# Patient Record
Sex: FEMALE | Race: WHITE | NOT HISPANIC OR LATINO | Employment: OTHER | URBAN - NONMETROPOLITAN AREA
[De-identification: names, ages, dates, MRNs, and addresses within clinical notes are randomized per-mention and may not be internally consistent; named-entity substitution may affect disease eponyms.]

---

## 2023-09-28 ENCOUNTER — HOSPITAL ENCOUNTER (OUTPATIENT)
Dept: GENERAL RADIOLOGY | Facility: HOSPITAL | Age: 73
Discharge: HOME | End: 2023-09-28
Payer: MEDICARE

## 2023-09-28 ENCOUNTER — OFFICE VISIT (OUTPATIENT)
Dept: NEUROSURGERY | Facility: CLINIC | Age: 73
End: 2023-09-28
Payer: MEDICARE

## 2023-09-28 VITALS — BODY MASS INDEX: 35.32 KG/M2 | HEIGHT: 65 IN | WEIGHT: 212 LBS

## 2023-09-28 DIAGNOSIS — M41.56 SCOLIOSIS OF LUMBAR REGION DUE TO DEGENERATIVE DISEASE OF SPINE IN ADULT: ICD-10-CM

## 2023-09-28 DIAGNOSIS — M51.34 DDD (DEGENERATIVE DISC DISEASE), THORACIC: Primary | ICD-10-CM

## 2023-09-28 DIAGNOSIS — M51.34 DDD (DEGENERATIVE DISC DISEASE), THORACIC: ICD-10-CM

## 2023-09-28 DIAGNOSIS — M48.062 SPINAL STENOSIS, LUMBAR REGION, WITH NEUROGENIC CLAUDICATION: ICD-10-CM

## 2023-09-28 PROCEDURE — 72072 X-RAY EXAM THORAC SPINE 3VWS: CPT

## 2023-09-28 PROCEDURE — 72114 X-RAY EXAM L-S SPINE BENDING: CPT

## 2023-09-28 RX ORDER — SUCRALFATE 1 G/1
1 TABLET ORAL 4 TIMES DAILY
COMMUNITY
Start: 2023-08-30

## 2023-09-28 RX ORDER — BETAMETHASONE DIPROPIONATE 0.05 %
1 OINTMENT (GRAM) TOPICAL DAILY
COMMUNITY
Start: 2023-07-06

## 2023-09-28 RX ORDER — EZETIMIBE 10 MG/1
10 TABLET ORAL DAILY
COMMUNITY
Start: 2023-06-16

## 2023-09-28 RX ORDER — CYCLOBENZAPRINE HCL 10 MG
10 TABLET ORAL 3 TIMES DAILY PRN
Qty: 90 TABLET | Refills: 0 | Status: SHIPPED | OUTPATIENT
Start: 2023-09-28

## 2023-09-28 RX ORDER — LISINOPRIL 10 MG/1
10 TABLET ORAL 2 TIMES DAILY
COMMUNITY
Start: 2023-08-31

## 2023-09-28 RX ORDER — HYDROCHLOROTHIAZIDE 12.5 MG/1
12.5 CAPSULE, GELATIN COATED ORAL EVERY MORNING
COMMUNITY
Start: 2023-07-10

## 2023-09-28 RX ORDER — ATENOLOL 25 MG/1
25 TABLET ORAL DAILY
COMMUNITY
Start: 2023-09-27

## 2023-09-28 RX ORDER — OMEPRAZOLE 40 MG/1
40 CAPSULE, DELAYED RELEASE ORAL DAILY
COMMUNITY
Start: 2023-06-15

## 2023-09-28 NOTE — PROGRESS NOTES
"    Chief complaint:   Chief Complaint   Patient presents with    Back Pain     Low back pain radiates down both legs        Subjective     HPI: Kemi presents with complaints of worsening chronic low back pain.  She has history of L4-5 KLIF with pseudoarthrosis and revision surgery, Dr Glez 2022.  She is also having some lower thoracic pain with radiation to the right anterior costal margin.  Symptoms significantly exacerbated in July after doing quite a bit of yard work that involves a lot of bending.  She indicates that with standing greater than 15 minutes her legs feel heavy.  She is having some radiating pain from her lower lumbar spine to bilateral iliac crests. She reports some paresthesias down the lateral anterior thighs.  She is currently taking gabapentin 200 mg at bedtime.  She is unable to take NSAIDs due to CKD.  He has used Flexeril in the past.  She denies any focal weakness.  She indicates that she recently completed 6 weeks of physical therapy with a therapist known to her in her home.  It was mildly beneficial.    Review of Systems     Past Medical History:   Diagnosis Date    Arthritis     Back pain     GERD (gastroesophageal reflux disease)     HTN (hypertension)     Kidney disease, chronic, stage III (GFR 30-59 ml/min)      Past Surgical History:   Procedure Laterality Date    BACK SURGERY      BUNIONECTOMY      CHOLECYSTECTOMY      HYSTERECTOMY      Partial    KNEE SURGERY       History reviewed. No pertinent family history.  Social History     Tobacco Use    Smoking status: Former     Types: Cigarettes    Smokeless tobacco: Never   Substance Use Topics    Alcohol use: Never    Drug use: Never     (Not in a hospital admission)    Allergies:  Sulfamethoxazole-trimethoprim    Objective      Vital Signs  Ht 165.1 cm (65\")   Wt 96.2 kg (212 lb)   BMI 35.28 kg/m²     Physical Exam  Constitutional:       Appearance: She is well-developed. She is obese.   HENT:      Head: Normocephalic.   Eyes: "      General: Lids are normal.      Conjunctiva/sclera: Conjunctivae normal.      Pupils: Pupils are equal, round, and reactive to light.   Cardiovascular:      Rate and Rhythm: Normal rate and regular rhythm.   Pulmonary:      Effort: Pulmonary effort is normal.      Breath sounds: Normal breath sounds.   Musculoskeletal:         General: Tenderness present. Injury: There is tenderness to palpation bilateral SI joints..Normal range of motion.      Cervical back: Normal range of motion.   Skin:     General: Skin is warm.   Neurological:      Mental Status: She is alert and oriented to person, place, and time.      GCS: GCS eye subscore is 4. GCS verbal subscore is 5. GCS motor subscore is 6.      Cranial Nerves: Cranial nerves 2-12 are intact. No cranial nerve deficit.      Sensory: No sensory deficit.      Deep Tendon Reflexes: Reflexes normal.      Reflex Scores:       Patellar reflexes are 1+ on the right side and 1+ on the left side.       Achilles reflexes are 1+ on the right side and 1+ on the left side.  Psychiatric:         Speech: Speech normal.         Behavior: Behavior normal.         Thought Content: Thought content normal.       Neurologic Exam     Mental Status   Oriented to person, place, and time.   Speech: speech is normal     Cranial Nerves   Cranial nerves II through XII intact.     CN III, IV, VI   Pupils are equal, round, and reactive to light.    Motor Exam   Muscle bulk: decreased  Overall muscle tone: normal    Strength   Right iliopsoas: 5/5  Left iliopsoas: 5/5  Right quadriceps: 5/5  Left quadriceps: 5/5  Right hamstrin/5  Left hamstrin/5  Right anterior tibial: 5/5  Left anterior tibial: 5/5  Right posterior tibial: 5/5  Left posterior tibial: 5/5  Right gastroc: 5/5  Left gastroc: 5/5Exquisite tenderness bilateral SI joints.     Sensory Exam   Light touch normal.     Gait, Coordination, and Reflexes     Reflexes   Right patellar: 1+  Left patellar: 1+  Right achilles: 1+  Left  achilles: 1+  Right Warner: absent  Left Warner: absent  Right ankle clonus: absent  Left ankle clonus: absentGait is compensated but steady.     Imaging review: MRI of the lumbar spine without contrast dated 2/3/2023 was reviewed and demonstrates fusion changes L4-5.  There is severe disc degeneration L3-4 with moderate central stenosis, bilateral lateral recess stenosis and moderate left neuroforaminal stenosis. There is moderate to severe NFS on the left at L2/3.  There is also severe disc degeneration T12-L1.  There is degenerative scoliosis with apex L2-3.  There does appear to be slight lateral listhesis to the left L1-2.        Assessment/Plan: Kemi has severe disc degeneration L3-4 with moderate central stenosis and degenerative lumbar scoliosis with lateral listhesis L1-2.  There is also severe disc degeneration T12-L1 she is having a thoracic radiculopathy on the right.  She is reporting some neurogenic claudication symptoms.  I do not see any marked central stenosis on her lumbar MRI and since she is complaining of lower thoracic pain with radicular symptoms, I would like to get MRI of the thoracic spine without contrast to better assess her issues.  She is neurologically stable on exam.  She has completed 6 weeks of physical therapy without symptom resolution.     I would like to get the lumbar and thoracic films today evaluate alignment and for any instability.    To assist with pain, I am giving her a prescription for Flexeril today.  We will continue gabapentin as current.  She will follow-up after the MRI is completed with Dr. Glez.    I advised the patient to call and return sooner for new or worsening complaints of weakness, paresthesias, gait disturbances, or any additional concerns.  Treatment options discussed in detail with Kemi and the patient voiced understanding.  Ms. Lorenzana agrees with this plan of care.     Patient is a nonsmoker    The patient's Body mass index is 35.28 kg/m².. BMI  is above normal parameters. Recommendations include: educational material    ADVANCED CARE PLANNING  Information on advance directives provided in AVS.    KIERSTEN Fall Risk Assessment was completed, and patient is at MODERATE risk for falls. Assessment completed on:9/28/2023       Diagnoses and all orders for this visit:    1. DDD (degenerative disc disease), thoracic (Primary)  -     MRI Thoracic Spine Without Contrast; Future  -     XR Spine Thoracic 2 View; Future  -     cyclobenzaprine (FLEXERIL) 10 MG tablet; Take 1 tablet by mouth 3 (Three) Times a Day As Needed for Muscle Spasms.  Dispense: 90 tablet; Refill: 0  -     XR Spine Lumbar Complete With Flex & Ext    2. Scoliosis of lumbar region due to degenerative disease of spine in adult  -     MRI Thoracic Spine Without Contrast; Future  -     XR Spine Thoracic 2 View; Future  -     cyclobenzaprine (FLEXERIL) 10 MG tablet; Take 1 tablet by mouth 3 (Three) Times a Day As Needed for Muscle Spasms.  Dispense: 90 tablet; Refill: 0  -     XR Spine Lumbar Complete With Flex & Ext    3. Spinal stenosis, lumbar region, with neurogenic claudication          I discussed the patients findings and my recommendations with patient    Myriam Larson PA-C  09/28/23  15:09 CDT

## 2023-10-31 ENCOUNTER — HOSPITAL ENCOUNTER (OUTPATIENT)
Dept: MRI IMAGING | Facility: HOSPITAL | Age: 73
Discharge: HOME | End: 2023-10-31
Admitting: PHYSICIAN ASSISTANT
Payer: MEDICARE

## 2023-10-31 ENCOUNTER — OFFICE VISIT (OUTPATIENT)
Dept: NEUROSURGERY | Facility: CLINIC | Age: 73
End: 2023-10-31
Payer: MEDICARE

## 2023-10-31 VITALS — HEIGHT: 65 IN | WEIGHT: 227 LBS | BODY MASS INDEX: 37.82 KG/M2

## 2023-10-31 DIAGNOSIS — M54.40 CHRONIC LOW BACK PAIN WITH SCIATICA, SCIATICA LATERALITY UNSPECIFIED, UNSPECIFIED BACK PAIN LATERALITY: ICD-10-CM

## 2023-10-31 DIAGNOSIS — Z78.9 NON-SMOKER: Primary | ICD-10-CM

## 2023-10-31 DIAGNOSIS — M54.16 LUMBAR RADICULOPATHY: ICD-10-CM

## 2023-10-31 DIAGNOSIS — M51.34 DDD (DEGENERATIVE DISC DISEASE), THORACIC: ICD-10-CM

## 2023-10-31 DIAGNOSIS — M41.56 SCOLIOSIS OF LUMBAR REGION DUE TO DEGENERATIVE DISEASE OF SPINE IN ADULT: ICD-10-CM

## 2023-10-31 DIAGNOSIS — M41.9 SCOLIOSIS, UNSPECIFIED SCOLIOSIS TYPE, UNSPECIFIED SPINAL REGION: ICD-10-CM

## 2023-10-31 DIAGNOSIS — G89.29 CHRONIC LOW BACK PAIN WITH SCIATICA, SCIATICA LATERALITY UNSPECIFIED, UNSPECIFIED BACK PAIN LATERALITY: ICD-10-CM

## 2023-10-31 DIAGNOSIS — E66.09 CLASS 2 OBESITY DUE TO EXCESS CALORIES WITH BODY MASS INDEX (BMI) OF 37.0 TO 37.9 IN ADULT, UNSPECIFIED WHETHER SERIOUS COMORBIDITY PRESENT: ICD-10-CM

## 2023-10-31 PROCEDURE — 72146 MRI CHEST SPINE W/O DYE: CPT

## 2023-10-31 NOTE — PROGRESS NOTES
"    Chief complaint:   Chief Complaint   Patient presents with    Back Pain     Follow up on back pain and discuss MRI        Subjective     HPI: I had a chance to see Kemi today in follow-up and to review her imaging studies of the lumbar and thoracic spine.  She does have very severe thoracic degenerative disc disease however I do not see anything that would require surgical intervention however she may benefit from some injections.  She largely complains of some lumbar back pain with occasional L3 and L4 symptoms which are consistent with the severe Modic changes that we see above the level of her fusion at L4/5.  She does have a scoliotic curvature centered around L2/3 and L3/4 and most of her symptoms are on the left which makes sense for the type of curvature she is displaying in her coronal MRIs.    Review of Systems      Objective      Vital Signs  Ht 165.1 cm (65\")   Wt 103 kg (227 lb)   BMI 37.77 kg/m²     Physical Exam  Neurological:      Mental Status: She is oriented to person, place, and time.      Cranial Nerves: Cranial nerves 2-12 are intact.      Motor: Motor strength is normal.     Gait: Gait is intact.   Psychiatric:         Speech: Speech normal.         Neurologic Exam     Mental Status   Oriented to person, place, and time.   Attention: normal. Concentration: normal.   Speech: speech is normal   Level of consciousness: alert  Knowledge: good.   Normal comprehension.     Cranial Nerves   Cranial nerves II through XII intact.     Motor Exam     Strength   Strength 5/5 throughout.     Sensory Exam   Light touch normal.     Gait, Coordination, and Reflexes     Gait  Gait: normal      Imaging review: MRI of the lumbar spine does show significant degenerative disc disease particularly above the level of her previous L4/5 fusion and severe Modic changes are present at L3/4 and to a lesser degree L2/3.  I do think that these levels are most likely the levels that are causing the bulk of her pain " in both the back and the legs.        Assessment/Plan:   Severe disc degeneration most pronounced at L2/3 and L3/4  Degenerative scoliosis    At this point I do think she may be a surgical candidate for what I am seeing at L2/3 and L3/4 and we would likely use a direct lateral approach and revise her posterior pedicle screw instrumentation to cover these levels.  I do think it would be prudent to try some facet injections with possible rhizotomies to see if we can cut down enough of the pain to avoid the surgery at this point.  We will refer her to Dr. Adan for some lumbar facet injections at L2/3 and L3/4 with possible rhizotomies to see if we can get her pain under control and avoid surgery.  I would like to see her back once these injections are complete.  I look forward to seeing her at her next visit.    Patient is a nonsmoker  The patient's Body mass index is 37.77 kg/m².. BMI is above normal parameters. Recommendations include: continue with current weight loss program    Diagnoses and all orders for this visit:    1. Non-smoker (Primary)    2. Class 2 obesity due to excess calories with body mass index (BMI) of 37.0 to 37.9 in adult, unspecified whether serious comorbidity present    3. Chronic low back pain with sciatica, sciatica laterality unspecified, unspecified back pain laterality  -     Ambulatory Referral to Pain Management    4. Scoliosis, unspecified scoliosis type, unspecified spinal region  -     Ambulatory Referral to Pain Management    5. Lumbar radiculopathy        I discussed the patients findings and my recommendations with patient  Roland HOPKINS DO [place]  10/31/23  13:40 CDT

## 2024-02-06 ENCOUNTER — OFFICE VISIT (OUTPATIENT)
Dept: NEUROSURGERY | Facility: CLINIC | Age: 74
End: 2024-02-06
Payer: MEDICARE

## 2024-02-06 VITALS — HEIGHT: 65 IN | WEIGHT: 227 LBS | BODY MASS INDEX: 37.82 KG/M2

## 2024-02-06 DIAGNOSIS — Z78.9 NON-SMOKER: ICD-10-CM

## 2024-02-06 DIAGNOSIS — E66.01 CLASS 2 SEVERE OBESITY DUE TO EXCESS CALORIES WITH SERIOUS COMORBIDITY AND BODY MASS INDEX (BMI) OF 37.0 TO 37.9 IN ADULT: ICD-10-CM

## 2024-02-06 DIAGNOSIS — M41.9 SCOLIOSIS, UNSPECIFIED SCOLIOSIS TYPE, UNSPECIFIED SPINAL REGION: Primary | ICD-10-CM

## 2024-02-06 PROCEDURE — 99213 OFFICE O/P EST LOW 20 MIN: CPT | Performed by: NEUROLOGICAL SURGERY

## 2024-02-06 RX ORDER — LISINOPRIL 30 MG/1
40 TABLET ORAL DAILY
COMMUNITY
Start: 2023-12-28

## 2024-02-06 RX ORDER — AMLODIPINE BESYLATE 10 MG/1
10 TABLET ORAL DAILY
COMMUNITY
Start: 2024-01-26

## 2024-02-06 NOTE — PROGRESS NOTES
"    Chief complaint:   Chief Complaint   Patient presents with    Follow-up     Follow up on mid-lower back pain injections are helping some        Subjective     HPI: I had a chance to see Kemi today in follow-up and to review her imaging studies of the lumbar spine.  Fortunately she has done very well with injections and rhizotomies and I do think we are doing well enough to avoid surgery.  I would like to see her back in another 8 weeks to make sure she is still doing well.      Review of Systems      Objective      Vital Signs  Ht 165.1 cm (65\")   Wt 103 kg (227 lb)   BMI 37.77 kg/m²     Physical Exam    Neurologic Exam    Imaging review: No new imaging studies        Assessment/Plan:   Multilevel degenerative disc disease of the lumbar spine  Scoliotic deformity    Given the fact that she is doing so well with the rhizotomies and injections I think we can avoid surgery for at least right now.  I would like to see her back in 2 to 3 months to check and see how she is doing.  I look forward to seeing her at her next visit.    Patient is a nonsmoker  The patient's Body mass index is 37.77 kg/m².. BMI is above normal parameters. Recommendations include: continue with current weight loss program    Diagnoses and all orders for this visit:    1. Scoliosis, unspecified scoliosis type, unspecified spinal region (Primary)    2. Non-smoker    3. Class 2 severe obesity due to excess calories with serious comorbidity and body mass index (BMI) of 37.0 to 37.9 in adult        I discussed the patients findings and my recommendations with patient  Roland SANDEEP Glez DO  02/06/24  14:55 CST          "

## 2024-08-12 ENCOUNTER — OFFICE VISIT (OUTPATIENT)
Dept: NEUROSURGERY | Facility: CLINIC | Age: 74
End: 2024-08-12
Payer: MEDICARE

## 2024-08-12 VITALS — BODY MASS INDEX: 37.82 KG/M2 | HEIGHT: 65 IN | WEIGHT: 227 LBS

## 2024-08-12 DIAGNOSIS — M54.16 LUMBAR RADICULOPATHY: Primary | ICD-10-CM

## 2024-08-12 DIAGNOSIS — M40.294 OTHER KYPHOSIS OF THORACIC REGION: ICD-10-CM

## 2024-08-12 DIAGNOSIS — E66.01 CLASS 2 SEVERE OBESITY DUE TO EXCESS CALORIES WITH SERIOUS COMORBIDITY AND BODY MASS INDEX (BMI) OF 37.0 TO 37.9 IN ADULT: ICD-10-CM

## 2024-08-12 DIAGNOSIS — Z78.9 NON-SMOKER: ICD-10-CM

## 2024-08-12 PROCEDURE — 99213 OFFICE O/P EST LOW 20 MIN: CPT | Performed by: NEUROLOGICAL SURGERY

## 2024-08-12 NOTE — PROGRESS NOTES
"    Chief complaint:   Chief Complaint   Patient presents with    Follow-up     Follow up mid to low back pain        Subjective     HPI: I had a chance to see Kemi today in follow-up and to review her most recent MRI which was of the thoracic spine.  She does have severe multilevel degenerative disc disease however most of her symptoms sound like neurogenic claudication with occasional radicular symptoms and low back pain.  I would like to get a new MRI of her lumbar spine to better evaluate what is going on in her low back.  I would also like to get some x-rays of the hips that she does occasionally have some groin pain which I want to make sure is not due to the hips themselves.    Review of Systems      Objective      Vital Signs  Ht 165.1 cm (65\")   Wt 103 kg (227 lb)   BMI 37.77 kg/m²     Physical Exam  Neurological:      Mental Status: She is oriented to person, place, and time.   Psychiatric:         Speech: Speech normal.         Neurologic Exam     Mental Status   Oriented to person, place, and time.   Attention: normal.   Speech: speech is normal   Knowledge: good.     Cranial Nerves     CN VII   Facial expression full, symmetric.     Motor Exam   Overall muscle tone: normal    Sensory Exam   Right arm light touch: normal  Left arm light touch: normal  Right leg light touch: normal  Left leg light touch: normal      Imaging review:   No new imaging        Assessment/Plan:   Lumbar back pain with neurogenic claudication and radiculopathy    I would like to get a new MRI of the patient's lumbar spine as she does sound like she is progressing as far as symptoms of neurogenic claudication and radiculopathy.  We will order the new MRI of her lumbar spine as well as some x-rays of the hips and she will follow-up with me once these are complete.  I look forward to seeing her at her next visit.    Patient is a nonsmoker  The patient's Body mass index is 37.77 kg/m².. BMI is above normal parameters. " Recommendations include: continue with current weight loss program    Diagnoses and all orders for this visit:    1. Lumbar radiculopathy (Primary)  -     MRI Lumbar Spine Without Contrast; Future    2. Class 2 severe obesity due to excess calories with serious comorbidity and body mass index (BMI) of 37.0 to 37.9 in adult    3. Non-smoker    4. Other kyphosis of thoracic region  -     Miscellaneous DME        I discussed the patients findings and my recommendations with patient  Roland Glez DO  08/12/24  16:11 CDT

## 2024-09-11 ENCOUNTER — HOSPITAL ENCOUNTER (OUTPATIENT)
Dept: MRI IMAGING | Facility: HOSPITAL | Age: 74
Discharge: HOME | End: 2024-09-11
Admitting: NEUROLOGICAL SURGERY
Payer: MEDICARE

## 2024-09-11 ENCOUNTER — OFFICE VISIT (OUTPATIENT)
Dept: NEUROSURGERY | Facility: CLINIC | Age: 74
End: 2024-09-11
Payer: MEDICARE

## 2024-09-11 VITALS — WEIGHT: 226.4 LBS | BODY MASS INDEX: 37.72 KG/M2 | HEIGHT: 65 IN

## 2024-09-11 DIAGNOSIS — M41.56 SCOLIOSIS OF LUMBAR REGION DUE TO DEGENERATIVE DISEASE OF SPINE IN ADULT: Primary | ICD-10-CM

## 2024-09-11 DIAGNOSIS — M54.16 LUMBAR RADICULOPATHY: ICD-10-CM

## 2024-09-11 PROCEDURE — 99213 OFFICE O/P EST LOW 20 MIN: CPT | Performed by: NEUROLOGICAL SURGERY

## 2024-09-11 PROCEDURE — 72148 MRI LUMBAR SPINE W/O DYE: CPT

## 2024-09-11 NOTE — PROGRESS NOTES
"    Chief complaint:   Chief Complaint   Patient presents with    Results     MRI T spine still having pain with activity        Subjective     HPI: I had a chance to see Kemi today in follow-up and to review her imaging studies of the lumbar spine.  She has had a previous fusion at L4/5 which appears stable however the most concerning thing that I am seeing is that she has a lateral listhesis of L3 on L4 and it does sound like she is starting to suffer from an L4 radiculopathy bilaterally.  She does feel that she is doing okay at this time and therefore I think we can continue with conservative management for now however we will watch this closely and she will call me if things worsen before her next follow-up.    Review of Systems      Objective      Vital Signs  Ht 165.1 cm (65\")   Wt 103 kg (226 lb 6.4 oz)   BMI 37.67 kg/m²     Physical Exam  Eyes:      General: Lids are normal.      Extraocular Movements: Extraocular movements intact.      Pupils: Pupils are equal, round, and reactive to light.   Psychiatric:         Speech: Speech normal.         Neurological Exam  Mental Status  Awake, alert and oriented to person, place and time. Speech is normal. Language is fluent with no aphasia. Attention and concentration are normal. Fund of knowledge is appropriate for level of education.    Cranial Nerves  CN II: Visual acuity is normal. Visual fields full to confrontation.  CN III, IV, VI: Extraocular movements intact bilaterally. Normal lids and orbits bilaterally. Pupils equal round and reactive to light bilaterally.  CN V: Facial sensation is normal.  CN VII: Full and symmetric facial movement.  CN IX, X: Palate elevates symmetrically. Normal gag reflex.  CN XI: Shoulder shrug strength is normal.  CN XII: Tongue midline without atrophy or fasciculations.    Motor  Normal muscle bulk throughout. Normal muscle tone. No abnormal involuntary movements.    Sensory  Light touch is normal in upper and lower " extremities.     Gait  Casual gait is normal including stance, stride, and arm swing.       Imaging review:   Exam: MRI LUMBAR SPINE WO CONTRAST- 9/11/2024 11:29 AM     HISTORY: LUMBAR RADICULOPATHY; M54.16-Radiculopathy, lumbar region     COMPARISON: Radiograph 9/28/2023.     TECHNIQUE: Multiplanar, multisequence MRI of the lumbar spine was  obtained without contrast.     FINDINGS:     Posterior spinal fusion at L4-L5 with interbody disc graft.     Scoliotic curvature.     Type I Modic changes at L3-L4. No suspicious marrow replacement process.     Conus terminates at L1-L2. No abnormal conus signal or cauda equina  nerve root nodularity.     Multilevel degenerative disc disease with posterior disc osteophyte  complexes. Multilevel facet hypertrophic changes.     L1-L2: No significant spinal canal or foraminal narrowing.     L2-L3: No significant spinal canal narrowing. Mild right and moderate  left foraminal narrowing.     L3-L4: Minimal spinal canal narrowing. At least moderate left foraminal  narrowing.     L4-L5: No significant spinal canal narrowing. Foramina are limited in  evaluation, however at least mild bilateral foraminal narrowing.     L5-S1: Normal tapering of the thecal sac without significant spinal  canal narrowing. No high-grade foraminal narrowing.     Extraspinal findings: None.     IMPRESSION:     Posterior spinal fusion at L4-L5.     Multilevel spondylotic changes and scoliotic curvature; most notable  with type I Modic changes at L3-L4 as well as minimal L3-L4 spinal canal  narrowing.     At least moderate left L2-L3 and L3-L4 foraminal narrowing.              Assessment/Plan:   L3/4 lateral listhesis with stenosis and radiculopathy    Given the fact that Kemi feels she is doing okay at this time I do think that we can continue to follow this conservatively for now.  I would like to see her back however in 3 months to check on her progress and she will call me in the meantime if things  worsen.  I look forward to seeing her at her next visit.    Patient is a nonsmoker  The patient's Body mass index is 37.67 kg/m².. BMI is above normal parameters. Recommendations include: continue with current weight loss program    There are no diagnoses linked to this encounter.    I discussed the patients findings and my recommendations with patient  Roland SANDEEP Glez DO  09/11/24  16:27 CDT

## 2024-12-16 ENCOUNTER — OFFICE VISIT (OUTPATIENT)
Dept: NEUROSURGERY | Facility: CLINIC | Age: 74
End: 2024-12-16
Payer: MEDICARE

## 2024-12-16 VITALS — BODY MASS INDEX: 37.65 KG/M2 | WEIGHT: 226 LBS | HEIGHT: 65 IN

## 2024-12-16 DIAGNOSIS — M41.56 SCOLIOSIS OF LUMBAR REGION DUE TO DEGENERATIVE DISEASE OF SPINE IN ADULT: Primary | ICD-10-CM

## 2024-12-16 DIAGNOSIS — E66.812 CLASS 2 SEVERE OBESITY DUE TO EXCESS CALORIES WITH SERIOUS COMORBIDITY AND BODY MASS INDEX (BMI) OF 37.0 TO 37.9 IN ADULT: ICD-10-CM

## 2024-12-16 DIAGNOSIS — M48.062 SPINAL STENOSIS, LUMBAR REGION, WITH NEUROGENIC CLAUDICATION: ICD-10-CM

## 2024-12-16 DIAGNOSIS — M41.9 SCOLIOSIS, UNSPECIFIED SCOLIOSIS TYPE, UNSPECIFIED SPINAL REGION: ICD-10-CM

## 2024-12-16 DIAGNOSIS — E66.01 CLASS 2 SEVERE OBESITY DUE TO EXCESS CALORIES WITH SERIOUS COMORBIDITY AND BODY MASS INDEX (BMI) OF 37.0 TO 37.9 IN ADULT: ICD-10-CM

## 2024-12-16 RX ORDER — LISINOPRIL 20 MG/1
10 TABLET ORAL
COMMUNITY

## 2024-12-16 NOTE — PROGRESS NOTES
"L2/3 L3/4 direct lateral interbody fusion with revision of instrumentation L2-L4    Chief complaint:   Chief Complaint   Patient presents with    Follow-up     Follow up low back pain wants to discuss surgery        Subjective     HPI: I had a chance to see Kemi today in follow-up and to review her imaging studies of the lumbar spine.  Unfortunately Kemi does have a lateral listhesis of L3 on L4 which is the level above her fusion and she also has a scoliotic deformity centered around L2/3 and L3/4.  She unfortunately has not done very well with conservative management and is still having extreme pain and difficulty with ambulation.    Review of Systems      Objective      Vital Signs  Ht 165.1 cm (65\")   Wt 103 kg (226 lb)   BMI 37.61 kg/m²     Physical Exam  Eyes:      General: Lids are normal.      Extraocular Movements: Extraocular movements intact.   Neurological:      Motor: Motor strength is normal.  Psychiatric:         Speech: Speech normal.         Neurological Exam  Mental Status  Awake, alert and oriented to person, place and time. Oriented to person, place and time. Speech is normal. Language is fluent with no aphasia. Attention and concentration are normal. Fund of knowledge is appropriate for level of education.    Cranial Nerves  CN III, IV, VI: Extraocular movements intact bilaterally. Normal lids and orbits bilaterally.    Motor   No abnormal involuntary movements. Strength is 5/5 throughout all four extremities.    Gait  Casual gait is normal including stance, stride, and arm swing.       Imaging review: Exam: MRI LUMBAR SPINE WO CONTRAST- 9/11/2024 11:29 AM     HISTORY: LUMBAR RADICULOPATHY; M54.16-Radiculopathy, lumbar region     COMPARISON: Radiograph 9/28/2023.     TECHNIQUE: Multiplanar, multisequence MRI of the lumbar spine was  obtained without contrast.     FINDINGS:     Posterior spinal fusion at L4-L5 with interbody disc graft.     Scoliotic curvature.     Type I Modic changes at " L3-L4. No suspicious marrow replacement process.     Conus terminates at L1-L2. No abnormal conus signal or cauda equina  nerve root nodularity.     Multilevel degenerative disc disease with posterior disc osteophyte  complexes. Multilevel facet hypertrophic changes.     L1-L2: No significant spinal canal or foraminal narrowing.     L2-L3: No significant spinal canal narrowing. Mild right and moderate  left foraminal narrowing.     L3-L4: Minimal spinal canal narrowing. At least moderate left foraminal  narrowing.     L4-L5: No significant spinal canal narrowing. Foramina are limited in  evaluation, however at least mild bilateral foraminal narrowing.     L5-S1: Normal tapering of the thecal sac without significant spinal  canal narrowing. No high-grade foraminal narrowing.     Extraspinal findings: None.     IMPRESSION:     Posterior spinal fusion at L4-L5.     Multilevel spondylotic changes and scoliotic curvature; most notable  with type I Modic changes at L3-L4 as well as minimal L3-L4 spinal canal  narrowing.     At least moderate left L2-L3 and L3-L4 foraminal narrowing.              Assessment/Plan:   Scoliosis with spinal instability L3/4.    Given what I am seeing on the MRI I do think that Kemi is going to need stabilization at L2/3 and L3/4.  She does have a lateral listhesis particularly at L3/4 and given the fact that she has not done well with conservative management I did offer her a direct lateral interbody fusion at L2/3 and L3/4 with revision of her pedicle screw instrumentation from L2-L5 with revision of her previously placed screws.  I did explain to her the risks and benefits of the procedure and she would like to proceed.  We will work on getting her medically cleared and on the operative schedule at our first opening.       Patient is a nonsmoker  The patient's Body mass index is 37.61 kg/m².. BMI is above normal parameters. Recommendations include: continue with current weight loss  program    Diagnoses and all orders for this visit:    1. Scoliosis of lumbar region due to degenerative disease of spine in adult (Primary)    2. Class 2 severe obesity due to excess calories with serious comorbidity and body mass index (BMI) of 37.0 to 37.9 in adult    3. Scoliosis, unspecified scoliosis type, unspecified spinal region    4. Spinal stenosis, lumbar region, with neurogenic claudication        I discussed the patients findings and my recommendations with patient  Roland Glez DO  12/19/24  09:47 CST

## 2025-02-13 ENCOUNTER — TELEPHONE (OUTPATIENT)
Dept: NEUROSURGERY | Facility: CLINIC | Age: 75
End: 2025-02-13
Payer: MEDICARE

## 2025-02-13 NOTE — TELEPHONE ENCOUNTER
ATTEMPTED TO CALL PT TO NOTIFY OF SURGERY DATE AND TIME. PT DID NOT ANSWER, LEFT VOICE MESSAGE WITH ALL INFORMATION. MAILED LTR AS WELL. OK TO TRANSFER TO [facility] IF PT CALLS BACK.

## 2025-03-10 ENCOUNTER — PRE-ADMISSION TESTING (OUTPATIENT)
Dept: PREADMISSION TESTING | Facility: HOSPITAL | Age: 75
End: 2025-03-10
Payer: MEDICARE

## 2025-03-10 VITALS
OXYGEN SATURATION: 96 % | SYSTOLIC BLOOD PRESSURE: 152 MMHG | DIASTOLIC BLOOD PRESSURE: 68 MMHG | HEIGHT: 64 IN | WEIGHT: 222.66 LBS | BODY MASS INDEX: 38.01 KG/M2 | HEART RATE: 67 BPM | RESPIRATION RATE: 16 BRPM

## 2025-03-10 LAB
ANION GAP SERPL CALCULATED.3IONS-SCNC: 10 MMOL/L (ref 5–15)
BUN SERPL-MCNC: 29 MG/DL (ref 8–23)
BUN/CREAT SERPL: 22.8 (ref 7–25)
CALCIUM SPEC-SCNC: 9.2 MG/DL (ref 8.6–10.5)
CHLORIDE SERPL-SCNC: 104 MMOL/L (ref 98–107)
CO2 SERPL-SCNC: 26 MMOL/L (ref 22–29)
CREAT SERPL-MCNC: 1.27 MG/DL (ref 0.57–1)
DEPRECATED RDW RBC AUTO: 47.6 FL (ref 37–54)
EGFRCR SERPLBLD CKD-EPI 2021: 45 ML/MIN/1.73
ERYTHROCYTE [DISTWIDTH] IN BLOOD BY AUTOMATED COUNT: 14.1 % (ref 12.3–15.4)
GLUCOSE SERPL-MCNC: 93 MG/DL (ref 65–99)
HCT VFR BLD AUTO: 39.7 % (ref 34–46.6)
HGB BLD-MCNC: 12.7 G/DL (ref 12–15.9)
MCH RBC QN AUTO: 29.1 PG (ref 26.6–33)
MCHC RBC AUTO-ENTMCNC: 32 G/DL (ref 31.5–35.7)
MCV RBC AUTO: 90.8 FL (ref 79–97)
PLATELET # BLD AUTO: 277 10*3/MM3 (ref 140–450)
PMV BLD AUTO: 12 FL (ref 6–12)
POTASSIUM SERPL-SCNC: 4.2 MMOL/L (ref 3.5–5.2)
RBC # BLD AUTO: 4.37 10*6/MM3 (ref 3.77–5.28)
SODIUM SERPL-SCNC: 140 MMOL/L (ref 136–145)
WBC NRBC COR # BLD AUTO: 6.81 10*3/MM3 (ref 3.4–10.8)

## 2025-03-10 PROCEDURE — 80048 BASIC METABOLIC PNL TOTAL CA: CPT

## 2025-03-10 PROCEDURE — 85027 COMPLETE CBC AUTOMATED: CPT

## 2025-03-10 PROCEDURE — 93005 ELECTROCARDIOGRAM TRACING: CPT

## 2025-03-10 PROCEDURE — 36415 COLL VENOUS BLD VENIPUNCTURE: CPT

## 2025-03-10 NOTE — DISCHARGE INSTRUCTIONS

## 2025-03-15 LAB
QT INTERVAL: 408 MS
QTC INTERVAL: 424 MS

## 2025-03-25 ENCOUNTER — TELEPHONE (OUTPATIENT)
Dept: NEUROSURGERY | Facility: CLINIC | Age: 75
End: 2025-03-25
Payer: MEDICARE

## 2025-03-25 NOTE — TELEPHONE ENCOUNTER
PATIENT HAS BEEN RESCHEDULED TO 4/4/24 ARRIVING AT 5AM. I HAVE ATTEMPTED TO CALL HER BUT THE PHONE LINES KEEP SAYING THE CALL IS NOT ABLE TO BE COMPLETED AT THIS TIME.

## 2025-03-25 NOTE — TELEPHONE ENCOUNTER
Patient called stating she's sick as a dog congestion and fever needs to reschedule her surgery that's scheduled for 4/27/25

## 2025-04-23 NOTE — SIGNIFICANT NOTE
Home Rx;  Lisinopril; instructed Not to take x 24 hours before DOS-4/25/25.  Prilosec (omeprazole); instructed to take, w/ sip of water the DOS-4/25/25.  Tenormin (atenolol); instructed to take, w/ sip of water the DOS-4/25/25.

## 2025-04-24 ENCOUNTER — ANESTHESIA EVENT (OUTPATIENT)
Dept: PERIOP | Facility: HOSPITAL | Age: 75
End: 2025-04-24
Payer: MEDICARE

## 2025-04-25 ENCOUNTER — ANESTHESIA (OUTPATIENT)
Dept: PERIOP | Facility: HOSPITAL | Age: 75
End: 2025-04-25
Payer: MEDICARE

## 2025-04-25 ENCOUNTER — APPOINTMENT (OUTPATIENT)
Dept: GENERAL RADIOLOGY | Facility: HOSPITAL | Age: 75
End: 2025-04-25
Payer: MEDICARE

## 2025-04-25 ENCOUNTER — HOSPITAL ENCOUNTER (INPATIENT)
Facility: HOSPITAL | Age: 75
LOS: 1 days | Discharge: HOME | End: 2025-04-26
Attending: NEUROLOGICAL SURGERY | Admitting: NEUROLOGICAL SURGERY
Payer: MEDICARE

## 2025-04-25 DIAGNOSIS — M48.062 SPINAL STENOSIS, LUMBAR REGION, WITH NEUROGENIC CLAUDICATION: ICD-10-CM

## 2025-04-25 DIAGNOSIS — M41.56 SCOLIOSIS OF LUMBAR REGION DUE TO DEGENERATIVE DISEASE OF SPINE IN ADULT: ICD-10-CM

## 2025-04-25 DIAGNOSIS — Z74.09 IMPAIRED MOBILITY: Primary | ICD-10-CM

## 2025-04-25 LAB
ANION GAP SERPL CALCULATED.3IONS-SCNC: 12 MMOL/L (ref 5–15)
BUN SERPL-MCNC: 41 MG/DL (ref 8–23)
BUN/CREAT SERPL: 31.8 (ref 7–25)
CALCIUM SPEC-SCNC: 9.5 MG/DL (ref 8.6–10.5)
CHLORIDE SERPL-SCNC: 106 MMOL/L (ref 98–107)
CO2 SERPL-SCNC: 23 MMOL/L (ref 22–29)
CREAT SERPL-MCNC: 1.29 MG/DL (ref 0.57–1)
DEPRECATED RDW RBC AUTO: 46.7 FL (ref 37–54)
EGFRCR SERPLBLD CKD-EPI 2021: 44.2 ML/MIN/1.73
ERYTHROCYTE [DISTWIDTH] IN BLOOD BY AUTOMATED COUNT: 14.1 % (ref 12.3–15.4)
GLUCOSE BLDC GLUCOMTR-MCNC: 121 MG/DL (ref 70–130)
GLUCOSE BLDC GLUCOMTR-MCNC: 143 MG/DL (ref 70–130)
GLUCOSE SERPL-MCNC: 111 MG/DL (ref 65–99)
HCT VFR BLD AUTO: 37.8 % (ref 34–46.6)
HGB BLD-MCNC: 12.1 G/DL (ref 12–15.9)
MCH RBC QN AUTO: 29.4 PG (ref 26.6–33)
MCHC RBC AUTO-ENTMCNC: 32 G/DL (ref 31.5–35.7)
MCV RBC AUTO: 91.7 FL (ref 79–97)
PLATELET # BLD AUTO: 283 10*3/MM3 (ref 140–450)
PMV BLD AUTO: 12.4 FL (ref 6–12)
POTASSIUM SERPL-SCNC: 4.2 MMOL/L (ref 3.5–5.2)
RBC # BLD AUTO: 4.12 10*6/MM3 (ref 3.77–5.28)
SODIUM SERPL-SCNC: 141 MMOL/L (ref 136–145)
WBC NRBC COR # BLD AUTO: 7.16 10*3/MM3 (ref 3.4–10.8)

## 2025-04-25 PROCEDURE — 72100 X-RAY EXAM L-S SPINE 2/3 VWS: CPT

## 2025-04-25 PROCEDURE — 82948 REAGENT STRIP/BLOOD GLUCOSE: CPT

## 2025-04-25 PROCEDURE — 85027 COMPLETE CBC AUTOMATED: CPT | Performed by: NEUROLOGICAL SURGERY

## 2025-04-25 PROCEDURE — 25010000002 CEFAZOLIN 3 G RECONSTITUTED SOLUTION 1 EACH VIAL: Performed by: NEUROLOGICAL SURGERY

## 2025-04-25 PROCEDURE — 25010000002 HYDROMORPHONE PER 4 MG: Performed by: ANESTHESIOLOGY

## 2025-04-25 PROCEDURE — C1713 ANCHOR/SCREW BN/BN,TIS/BN: HCPCS | Performed by: NEUROLOGICAL SURGERY

## 2025-04-25 PROCEDURE — 0QP004Z REMOVAL OF INTERNAL FIXATION DEVICE FROM LUMBAR VERTEBRA, OPEN APPROACH: ICD-10-PCS | Performed by: NEUROLOGICAL SURGERY

## 2025-04-25 PROCEDURE — 25010000002 ONDANSETRON PER 1 MG

## 2025-04-25 PROCEDURE — 25010000002 ONDANSETRON PER 1 MG: Performed by: ANESTHESIOLOGY

## 2025-04-25 PROCEDURE — 0SB20ZZ EXCISION OF LUMBAR VERTEBRAL DISC, OPEN APPROACH: ICD-10-PCS | Performed by: NEUROLOGICAL SURGERY

## 2025-04-25 PROCEDURE — 80048 BASIC METABOLIC PNL TOTAL CA: CPT | Performed by: NEUROLOGICAL SURGERY

## 2025-04-25 PROCEDURE — 22853 INSJ BIOMECHANICAL DEVICE: CPT | Performed by: NEUROLOGICAL SURGERY

## 2025-04-25 PROCEDURE — 76000 FLUOROSCOPY <1 HR PHYS/QHP: CPT

## 2025-04-25 PROCEDURE — 25810000003 LACTATED RINGERS PER 1000 ML: Performed by: NEUROLOGICAL SURGERY

## 2025-04-25 PROCEDURE — 25010000002 LIDOCAINE PF 2% 2 % SOLUTION

## 2025-04-25 PROCEDURE — 22585 ARTHRD ANT NTRBD MIN DSC EA: CPT | Performed by: NEUROLOGICAL SURGERY

## 2025-04-25 PROCEDURE — 25010000002 BUPIVACAINE 0.25 % SOLUTION: Performed by: NEUROLOGICAL SURGERY

## 2025-04-25 PROCEDURE — 0SG10A0 FUSION OF 2 OR MORE LUMBAR VERTEBRAL JOINTS WITH INTERBODY FUSION DEVICE, ANTERIOR APPROACH, ANTERIOR COLUMN, OPEN APPROACH: ICD-10-PCS | Performed by: NEUROLOGICAL SURGERY

## 2025-04-25 PROCEDURE — 25010000002 FENTANYL CITRATE (PF) 50 MCG/ML SOLUTION: Performed by: ANESTHESIOLOGY

## 2025-04-25 PROCEDURE — 25010000002 KETOROLAC TROMETHAMINE PER 15 MG: Performed by: NEUROLOGICAL SURGERY

## 2025-04-25 PROCEDURE — 25010000002 KETOROLAC TROMETHAMINE PER 15 MG

## 2025-04-25 PROCEDURE — 25010000002 VANCOMYCIN 1 G RECONSTITUTED SOLUTION 1 EACH VIAL: Performed by: NEUROLOGICAL SURGERY

## 2025-04-25 PROCEDURE — 25010000002 ONDANSETRON PER 1 MG: Performed by: NEUROLOGICAL SURGERY

## 2025-04-25 PROCEDURE — 25010000002 PROPOFOL 200 MG/20ML EMULSION

## 2025-04-25 PROCEDURE — 25010000002 FENTANYL CITRATE (PF) 50 MCG/ML SOLUTION

## 2025-04-25 PROCEDURE — 25010000002 GLYCOPYRROLATE 0.4 MG/2ML SOLUTION

## 2025-04-25 PROCEDURE — 22845 INSERT SPINE FIXATION DEVICE: CPT | Performed by: NEUROLOGICAL SURGERY

## 2025-04-25 PROCEDURE — 61783 SCAN PROC SPINAL: CPT | Performed by: NEUROLOGICAL SURGERY

## 2025-04-25 PROCEDURE — 0QH004Z INSERTION OF INTERNAL FIXATION DEVICE INTO LUMBAR VERTEBRA, OPEN APPROACH: ICD-10-PCS | Performed by: NEUROLOGICAL SURGERY

## 2025-04-25 PROCEDURE — 22842 INSERT SPINE FIXATION DEVICE: CPT | Performed by: NEUROLOGICAL SURGERY

## 2025-04-25 PROCEDURE — 22558 ARTHRD ANT NTRBD MIN DSC LUM: CPT | Performed by: NEUROLOGICAL SURGERY

## 2025-04-25 RX ORDER — PANTOPRAZOLE SODIUM 40 MG/1
40 TABLET, DELAYED RELEASE ORAL
Status: DISCONTINUED | OUTPATIENT
Start: 2025-04-26 | End: 2025-04-26 | Stop reason: HOSPADM

## 2025-04-25 RX ORDER — SODIUM CHLORIDE 9 MG/ML
40 INJECTION, SOLUTION INTRAVENOUS AS NEEDED
Status: DISCONTINUED | OUTPATIENT
Start: 2025-04-25 | End: 2025-04-25 | Stop reason: HOSPADM

## 2025-04-25 RX ORDER — AMLODIPINE BESYLATE 10 MG/1
10 TABLET ORAL DAILY
Status: ON HOLD | COMMUNITY
End: 2025-04-25

## 2025-04-25 RX ORDER — LIDOCAINE HYDROCHLORIDE 10 MG/ML
0.5 INJECTION, SOLUTION EPIDURAL; INFILTRATION; INTRACAUDAL; PERINEURAL ONCE AS NEEDED
Status: DISCONTINUED | OUTPATIENT
Start: 2025-04-25 | End: 2025-04-25 | Stop reason: HOSPADM

## 2025-04-25 RX ORDER — SODIUM CHLORIDE, SODIUM LACTATE, POTASSIUM CHLORIDE, CALCIUM CHLORIDE 600; 310; 30; 20 MG/100ML; MG/100ML; MG/100ML; MG/100ML
1000 INJECTION, SOLUTION INTRAVENOUS CONTINUOUS
Status: DISCONTINUED | OUTPATIENT
Start: 2025-04-25 | End: 2025-04-25

## 2025-04-25 RX ORDER — SODIUM CHLORIDE 0.9 % (FLUSH) 0.9 %
3 SYRINGE (ML) INJECTION EVERY 12 HOURS SCHEDULED
Status: DISCONTINUED | OUTPATIENT
Start: 2025-04-25 | End: 2025-04-25 | Stop reason: HOSPADM

## 2025-04-25 RX ORDER — CYCLOBENZAPRINE HCL 10 MG
10 TABLET ORAL 3 TIMES DAILY PRN
Status: DISCONTINUED | OUTPATIENT
Start: 2025-04-25 | End: 2025-04-26 | Stop reason: HOSPADM

## 2025-04-25 RX ORDER — OXYCODONE AND ACETAMINOPHEN 10; 325 MG/1; MG/1
1 TABLET ORAL EVERY 4 HOURS PRN
Status: DISCONTINUED | OUTPATIENT
Start: 2025-04-25 | End: 2025-04-25 | Stop reason: HOSPADM

## 2025-04-25 RX ORDER — BUPIVACAINE HCL/0.9 % NACL/PF 0.125 %
PLASTIC BAG, INJECTION (ML) EPIDURAL AS NEEDED
Status: DISCONTINUED | OUTPATIENT
Start: 2025-04-25 | End: 2025-04-25 | Stop reason: SURG

## 2025-04-25 RX ORDER — SUCCINYLCHOLINE/SOD CL,ISO/PF 200MG/10ML
SYRINGE (ML) INTRAVENOUS AS NEEDED
Status: DISCONTINUED | OUTPATIENT
Start: 2025-04-25 | End: 2025-04-25 | Stop reason: SURG

## 2025-04-25 RX ORDER — KETOROLAC TROMETHAMINE 30 MG/ML
15 INJECTION, SOLUTION INTRAMUSCULAR; INTRAVENOUS EVERY 6 HOURS PRN
Status: DISCONTINUED | OUTPATIENT
Start: 2025-04-25 | End: 2025-04-26 | Stop reason: HOSPADM

## 2025-04-25 RX ORDER — FENTANYL CITRATE 50 UG/ML
50 INJECTION, SOLUTION INTRAMUSCULAR; INTRAVENOUS
Status: DISCONTINUED | OUTPATIENT
Start: 2025-04-25 | End: 2025-04-25 | Stop reason: HOSPADM

## 2025-04-25 RX ORDER — AMLODIPINE BESYLATE 5 MG/1
5 TABLET ORAL DAILY
COMMUNITY

## 2025-04-25 RX ORDER — FLUMAZENIL 0.1 MG/ML
0.2 INJECTION INTRAVENOUS AS NEEDED
Status: DISCONTINUED | OUTPATIENT
Start: 2025-04-25 | End: 2025-04-25 | Stop reason: HOSPADM

## 2025-04-25 RX ORDER — TRIAMCINOLONE ACETONIDE 5 MG/G
1 OINTMENT TOPICAL 3 TIMES DAILY PRN
COMMUNITY

## 2025-04-25 RX ORDER — MAGNESIUM HYDROXIDE 1200 MG/15ML
LIQUID ORAL AS NEEDED
Status: DISCONTINUED | OUTPATIENT
Start: 2025-04-25 | End: 2025-04-25 | Stop reason: HOSPADM

## 2025-04-25 RX ORDER — KETOROLAC TROMETHAMINE 30 MG/ML
INJECTION, SOLUTION INTRAMUSCULAR; INTRAVENOUS AS NEEDED
Status: DISCONTINUED | OUTPATIENT
Start: 2025-04-25 | End: 2025-04-25 | Stop reason: SURG

## 2025-04-25 RX ORDER — PROPOFOL 10 MG/ML
INJECTION, EMULSION INTRAVENOUS AS NEEDED
Status: DISCONTINUED | OUTPATIENT
Start: 2025-04-25 | End: 2025-04-25 | Stop reason: SURG

## 2025-04-25 RX ORDER — MIDAZOLAM HYDROCHLORIDE 2 MG/2ML
0.5 INJECTION, SOLUTION INTRAMUSCULAR; INTRAVENOUS
Status: DISCONTINUED | OUTPATIENT
Start: 2025-04-25 | End: 2025-04-25 | Stop reason: HOSPADM

## 2025-04-25 RX ORDER — HYDROCHLOROTHIAZIDE 25 MG/1
12.5 TABLET ORAL DAILY
Status: DISCONTINUED | OUTPATIENT
Start: 2025-04-25 | End: 2025-04-26 | Stop reason: HOSPADM

## 2025-04-25 RX ORDER — ONDANSETRON 2 MG/ML
4 INJECTION INTRAMUSCULAR; INTRAVENOUS EVERY 6 HOURS PRN
Status: DISCONTINUED | OUTPATIENT
Start: 2025-04-25 | End: 2025-04-26 | Stop reason: HOSPADM

## 2025-04-25 RX ORDER — FENTANYL CITRATE 50 UG/ML
INJECTION, SOLUTION INTRAMUSCULAR; INTRAVENOUS AS NEEDED
Status: DISCONTINUED | OUTPATIENT
Start: 2025-04-25 | End: 2025-04-25 | Stop reason: SURG

## 2025-04-25 RX ORDER — SODIUM CHLORIDE 0.9 % (FLUSH) 0.9 %
3 SYRINGE (ML) INJECTION AS NEEDED
Status: DISCONTINUED | OUTPATIENT
Start: 2025-04-25 | End: 2025-04-25 | Stop reason: HOSPADM

## 2025-04-25 RX ORDER — GLYCOPYRROLATE 0.2 MG/ML
INJECTION INTRAMUSCULAR; INTRAVENOUS AS NEEDED
Status: DISCONTINUED | OUTPATIENT
Start: 2025-04-25 | End: 2025-04-25 | Stop reason: SURG

## 2025-04-25 RX ORDER — IBUPROFEN 600 MG/1
600 TABLET, FILM COATED ORAL EVERY 6 HOURS PRN
Status: DISCONTINUED | OUTPATIENT
Start: 2025-04-25 | End: 2025-04-25 | Stop reason: HOSPADM

## 2025-04-25 RX ORDER — BUPIVACAINE HYDROCHLORIDE 2.5 MG/ML
INJECTION, SOLUTION INFILTRATION; PERINEURAL AS NEEDED
Status: DISCONTINUED | OUTPATIENT
Start: 2025-04-25 | End: 2025-04-25 | Stop reason: HOSPADM

## 2025-04-25 RX ORDER — SODIUM CHLORIDE 0.9 % (FLUSH) 0.9 %
10 SYRINGE (ML) INJECTION AS NEEDED
Status: DISCONTINUED | OUTPATIENT
Start: 2025-04-25 | End: 2025-04-25 | Stop reason: HOSPADM

## 2025-04-25 RX ORDER — HYDROMORPHONE HYDROCHLORIDE 1 MG/ML
0.5 INJECTION, SOLUTION INTRAMUSCULAR; INTRAVENOUS; SUBCUTANEOUS
Status: DISCONTINUED | OUTPATIENT
Start: 2025-04-25 | End: 2025-04-25 | Stop reason: HOSPADM

## 2025-04-25 RX ORDER — ATENOLOL 25 MG/1
25 TABLET ORAL DAILY
Status: DISCONTINUED | OUTPATIENT
Start: 2025-04-26 | End: 2025-04-26 | Stop reason: HOSPADM

## 2025-04-25 RX ORDER — OXYCODONE AND ACETAMINOPHEN 7.5; 325 MG/1; MG/1
1 TABLET ORAL EVERY 6 HOURS PRN
Status: DISCONTINUED | OUTPATIENT
Start: 2025-04-25 | End: 2025-04-26 | Stop reason: HOSPADM

## 2025-04-25 RX ORDER — LIDOCAINE HYDROCHLORIDE 20 MG/ML
INJECTION, SOLUTION EPIDURAL; INFILTRATION; INTRACAUDAL; PERINEURAL AS NEEDED
Status: DISCONTINUED | OUTPATIENT
Start: 2025-04-25 | End: 2025-04-25 | Stop reason: SURG

## 2025-04-25 RX ORDER — ACETAMINOPHEN 500 MG
1000 TABLET ORAL ONCE
Status: DISCONTINUED | OUTPATIENT
Start: 2025-04-25 | End: 2025-04-25 | Stop reason: HOSPADM

## 2025-04-25 RX ORDER — SUCRALFATE 1 G/1
1 TABLET ORAL 4 TIMES DAILY
Status: DISCONTINUED | OUTPATIENT
Start: 2025-04-25 | End: 2025-04-26 | Stop reason: HOSPADM

## 2025-04-25 RX ORDER — LISINOPRIL 10 MG/1
10 TABLET ORAL
Status: DISCONTINUED | OUTPATIENT
Start: 2025-04-25 | End: 2025-04-26 | Stop reason: HOSPADM

## 2025-04-25 RX ORDER — NALOXONE HCL 0.4 MG/ML
0.04 VIAL (ML) INJECTION AS NEEDED
Status: DISCONTINUED | OUTPATIENT
Start: 2025-04-25 | End: 2025-04-25 | Stop reason: HOSPADM

## 2025-04-25 RX ORDER — ONDANSETRON 2 MG/ML
4 INJECTION INTRAMUSCULAR; INTRAVENOUS
Status: DISCONTINUED | OUTPATIENT
Start: 2025-04-25 | End: 2025-04-25 | Stop reason: HOSPADM

## 2025-04-25 RX ORDER — LABETALOL HYDROCHLORIDE 5 MG/ML
5 INJECTION, SOLUTION INTRAVENOUS
Status: DISCONTINUED | OUTPATIENT
Start: 2025-04-25 | End: 2025-04-25 | Stop reason: HOSPADM

## 2025-04-25 RX ORDER — SODIUM CHLORIDE 0.9 % (FLUSH) 0.9 %
3-10 SYRINGE (ML) INJECTION AS NEEDED
Status: DISCONTINUED | OUTPATIENT
Start: 2025-04-25 | End: 2025-04-25 | Stop reason: HOSPADM

## 2025-04-25 RX ORDER — ONDANSETRON 2 MG/ML
INJECTION INTRAMUSCULAR; INTRAVENOUS AS NEEDED
Status: DISCONTINUED | OUTPATIENT
Start: 2025-04-25 | End: 2025-04-25 | Stop reason: SURG

## 2025-04-25 RX ORDER — SODIUM CHLORIDE, SODIUM LACTATE, POTASSIUM CHLORIDE, CALCIUM CHLORIDE 600; 310; 30; 20 MG/100ML; MG/100ML; MG/100ML; MG/100ML
100 INJECTION, SOLUTION INTRAVENOUS CONTINUOUS
Status: DISCONTINUED | OUTPATIENT
Start: 2025-04-25 | End: 2025-04-25

## 2025-04-25 RX ADMIN — FENTANYL CITRATE 50 MCG: 50 INJECTION, SOLUTION INTRAMUSCULAR; INTRAVENOUS at 08:11

## 2025-04-25 RX ADMIN — ONDANSETRON 4 MG: 2 INJECTION INTRAMUSCULAR; INTRAVENOUS at 23:35

## 2025-04-25 RX ADMIN — FENTANYL CITRATE 50 MCG: 50 INJECTION, SOLUTION INTRAMUSCULAR; INTRAVENOUS at 09:33

## 2025-04-25 RX ADMIN — Medication 100 MCG: at 07:58

## 2025-04-25 RX ADMIN — Medication 160 MG: at 07:25

## 2025-04-25 RX ADMIN — Medication 25 MG: at 10:09

## 2025-04-25 RX ADMIN — FENTANYL CITRATE 50 MCG: 50 INJECTION, SOLUTION INTRAMUSCULAR; INTRAVENOUS at 08:22

## 2025-04-25 RX ADMIN — Medication 150 MCG: at 11:20

## 2025-04-25 RX ADMIN — FENTANYL CITRATE 50 MCG: 50 INJECTION, SOLUTION INTRAMUSCULAR; INTRAVENOUS at 11:31

## 2025-04-25 RX ADMIN — SODIUM CHLORIDE, POTASSIUM CHLORIDE, SODIUM LACTATE AND CALCIUM CHLORIDE 1000 ML: 600; 310; 30; 20 INJECTION, SOLUTION INTRAVENOUS at 06:12

## 2025-04-25 RX ADMIN — SUCRALFATE 1 G: 1 TABLET ORAL at 20:31

## 2025-04-25 RX ADMIN — FENTANYL CITRATE 100 MCG: 50 INJECTION, SOLUTION INTRAMUSCULAR; INTRAVENOUS at 08:54

## 2025-04-25 RX ADMIN — Medication 100 MCG: at 10:50

## 2025-04-25 RX ADMIN — Medication 10 MG: at 07:57

## 2025-04-25 RX ADMIN — Medication 10 MG: at 07:44

## 2025-04-25 RX ADMIN — FENTANYL CITRATE 50 MCG: 50 INJECTION, SOLUTION INTRAMUSCULAR; INTRAVENOUS at 08:38

## 2025-04-25 RX ADMIN — KETOROLAC TROMETHAMINE 15 MG: 30 INJECTION, SOLUTION INTRAMUSCULAR; INTRAVENOUS at 17:57

## 2025-04-25 RX ADMIN — ONDANSETRON 4 MG: 2 INJECTION INTRAMUSCULAR; INTRAVENOUS at 14:31

## 2025-04-25 RX ADMIN — HYDROMORPHONE HYDROCHLORIDE 0.5 MG: 1 INJECTION, SOLUTION INTRAMUSCULAR; INTRAVENOUS; SUBCUTANEOUS at 12:16

## 2025-04-25 RX ADMIN — SODIUM CHLORIDE 3000 MG: 900 INJECTION INTRAVENOUS at 07:28

## 2025-04-25 RX ADMIN — Medication 10 MG: at 07:53

## 2025-04-25 RX ADMIN — ONDANSETRON 4 MG: 2 INJECTION INTRAMUSCULAR; INTRAVENOUS at 11:22

## 2025-04-25 RX ADMIN — KETOROLAC TROMETHAMINE 30 MG: 30 INJECTION, SOLUTION INTRAMUSCULAR; INTRAVENOUS at 11:24

## 2025-04-25 RX ADMIN — ONDANSETRON 4 MG: 2 INJECTION INTRAMUSCULAR; INTRAVENOUS at 13:19

## 2025-04-25 RX ADMIN — HYDROMORPHONE HYDROCHLORIDE 0.5 MG: 1 INJECTION, SOLUTION INTRAMUSCULAR; INTRAVENOUS; SUBCUTANEOUS at 12:06

## 2025-04-25 RX ADMIN — SUCRALFATE 1 G: 1 TABLET ORAL at 17:58

## 2025-04-25 RX ADMIN — KETOROLAC TROMETHAMINE 15 MG: 30 INJECTION, SOLUTION INTRAMUSCULAR; INTRAVENOUS at 23:35

## 2025-04-25 RX ADMIN — Medication 150 MCG: at 09:51

## 2025-04-25 RX ADMIN — FENTANYL CITRATE 50 MCG: 50 INJECTION, SOLUTION INTRAMUSCULAR; INTRAVENOUS at 11:30

## 2025-04-25 RX ADMIN — LIDOCAINE HYDROCHLORIDE 100 MG: 20 INJECTION, SOLUTION EPIDURAL; INFILTRATION; INTRACAUDAL; PERINEURAL at 07:25

## 2025-04-25 RX ADMIN — GLYCOPYRROLATE 0.2 MG: 0.2 INJECTION INTRAMUSCULAR; INTRAVENOUS at 09:51

## 2025-04-25 RX ADMIN — Medication 100 MCG: at 09:53

## 2025-04-25 RX ADMIN — HYDROMORPHONE HYDROCHLORIDE 0.5 MG: 1 INJECTION, SOLUTION INTRAMUSCULAR; INTRAVENOUS; SUBCUTANEOUS at 12:26

## 2025-04-25 RX ADMIN — Medication 10 MG: at 07:48

## 2025-04-25 RX ADMIN — Medication 10 MG: at 08:00

## 2025-04-25 RX ADMIN — CYCLOBENZAPRINE HYDROCHLORIDE 10 MG: 10 TABLET, FILM COATED ORAL at 20:31

## 2025-04-25 RX ADMIN — SODIUM CHLORIDE, POTASSIUM CHLORIDE, SODIUM LACTATE AND CALCIUM CHLORIDE: 600; 310; 30; 20 INJECTION, SOLUTION INTRAVENOUS at 11:36

## 2025-04-25 RX ADMIN — SODIUM CHLORIDE, POTASSIUM CHLORIDE, SODIUM LACTATE AND CALCIUM CHLORIDE 1000 ML: 600; 310; 30; 20 INJECTION, SOLUTION INTRAVENOUS at 06:57

## 2025-04-25 RX ADMIN — FENTANYL CITRATE 50 MCG: 50 INJECTION, SOLUTION INTRAMUSCULAR; INTRAVENOUS at 12:21

## 2025-04-25 RX ADMIN — Medication 25 MG: at 09:07

## 2025-04-25 RX ADMIN — PROPOFOL 150 MG: 10 INJECTION, EMULSION INTRAVENOUS at 07:25

## 2025-04-25 RX ADMIN — FENTANYL CITRATE 100 MCG: 50 INJECTION, SOLUTION INTRAMUSCULAR; INTRAVENOUS at 07:25

## 2025-04-25 NOTE — H&P
H&P    CC: Back pain, leg pain            HPI: Patient is a 74-year-old female who unfortunately has a scoliotic deformity with severe back pain and lateral instability at L3/4 and significant degeneration at L2/3.  She is here today for a direct lateral interbody fusion at L2/3 and L3/4 and revision of her instrumentation from L2-L5    Review of Systems     Pertinent positives/negatives documented in HPI.  All other systems reviewed and negative.    Past Medical History:  has a past medical history of Arthritis, Back pain, DDD (degenerative disc disease), lumbar (04/2025), GERD (gastroesophageal reflux disease), HTN (hypertension), Kidney disease, chronic, stage III (GFR 30-59 ml/min), Scoliosis of lumbar spine (04/2025), and Spinal stenosis, lumbar (04/2025).    Past Surgical History:  has a past surgical history that includes Hysterectomy; Cholecystectomy; Back surgery; Knee surgery; Bunionectomy; Hand surgery (Right); and Cataract extraction (Bilateral).    Family History: family history is not on file.    Social History:  reports that she has quit smoking. Her smoking use included cigarettes. She has never used smokeless tobacco. She reports that she does not drink alcohol and does not use drugs.    Allergies: Darvon [propoxyphene], Demerol [meperidine], Sulfamethoxazole-trimethoprim, Tramadol, Tylenol [acetaminophen], and Statins    Medications: Scheduled Meds:acetaminophen, 1,000 mg, Oral, Once  ceFAZolin, 3,000 mg, Intravenous, Once  sodium chloride, 3 mL, Intravenous, Q12H      Continuous Infusions:lactated ringers, 1,000 mL, Last Rate: 1,000 mL (04/25/25 0657)  lactated ringers, 1,000 mL, Last Rate: 1,000 mL (04/25/25 0612)  lactated ringers, 100 mL/hr      PRN Meds:.  lidocaine PF 1%    midazolam    sodium chloride    sodium chloride    sodium chloride    sodium chloride     Objective:  Vital signs: (most recent): Blood pressure 146/78, pulse 69, temperature 98.3 °F (36.8 °C), temperature source Temporal,  resp. rate 16, weight 99.3 kg (218 lb 14.7 oz), SpO2 95%.        Neurological Exam  Mental Status  Awake, alert and oriented to person, place and time. Oriented to person, place and time. Speech is normal. Language is fluent with no aphasia. Attention and concentration are normal. Fund of knowledge is appropriate for level of education.    Cranial Nerves  CN III, IV, VI: Extraocular movements intact bilaterally. Normal lids and orbits bilaterally.    Motor   No abnormal involuntary movements. Strength is 5/5 throughout all four extremities.    Gait  Casual gait is normal including stance, stride, and arm swing.       Vital Signs  Temp:  [98.3 °F (36.8 °C)] 98.3 °F (36.8 °C)  Heart Rate:  [68-69] 69  Resp:  [16-18] 16  BP: (146)/(78) 146/78    Physical Exam  Eyes:      General: Lids are normal.      Extraocular Movements: Extraocular movements intact.   Neurological:      Motor: Motor strength is normal.  Psychiatric:         Speech: Speech normal.         Results Review:   I reviewed the patient's new clinical results.  I reviewed the patient's new imaging results and agree with the interpretation.  I reviewed the patient's other test results and agree with the interpretation          Lab Results (last 24 hours)       Procedure Component Value Units Date/Time    Basic Metabolic Panel [[id number]]  (Abnormal) Collected: 04/25/25 0605    Specimen: Blood Updated: 04/25/25 0640     Glucose 111 mg/dL      BUN 41 mg/dL      Creatinine 1.29 mg/dL      Sodium 141 mmol/L      Potassium 4.2 mmol/L      Chloride 106 mmol/L      CO2 23.0 mmol/L      Calcium 9.5 mg/dL      BUN/Creatinine Ratio 31.8     Anion Gap 12.0 mmol/L      eGFR 44.2 mL/min/1.73     Narrative:      GFR Categories in Chronic Kidney Disease (CKD)      GFR Category          GFR (mL/min/1.73)    Interpretation  G1                     90 or greater         Normal or high (1)  G2                      60-89                Mild decrease (1)  G3a                    45-59                Mild to moderate decrease  G3b                   30-44                Moderate to severe decrease  G4                    15-29                Severe decrease  G5                    14 or less           Kidney failure          (1)In the absence of evidence of kidney disease, neither GFR category G1 or G2 fulfill the criteria for CKD.    eGFR calculation 2021 CKD-EPI creatinine equation, which does not include race as a factor    POC Glucose Once [[id number]]  (Normal) Collected: 04/25/25 0614    Specimen: Blood Updated: 04/25/25 0626     Glucose 121 mg/dL      Comment: : [id number] Andre Merino) JanessaMeter ID: [id number]       CBC (No Diff) [[id number]]  (Abnormal) Collected: 04/25/25 0605    Specimen: Blood Updated: 04/25/25 0622     WBC 7.16 10*3/mm3      RBC 4.12 10*6/mm3      Hemoglobin 12.1 g/dL      Hematocrit 37.8 %      MCV 91.7 fL      MCH 29.4 pg      MCHC 32.0 g/dL      RDW 14.1 %      RDW-SD 46.7 fl      MPV 12.4 fL      Platelets 283 10*3/mm3               Assessment/Plan:   Direct lateral interbody fusion L2/3 and L3/4 with revision of instrumentation        Scoliosis of lumbar region due to degenerative disease of spine in adult    Spinal stenosis, lumbar region, with neurogenic claudication      I discussed the patient's findings and my recommendations with patient    Roland HOPKINS DO [place]  04/25/25  07:08 CDT    I spent 30 minutes caring for Kemi on this date of service. This time includes time spent by me in the following activities: preparing for the visit, reviewing tests, and performing a medically appropriate examination and/or evaluation

## 2025-04-25 NOTE — ANESTHESIA PROCEDURE NOTES
Airway  Date/Time: 4/25/2025 7:27 AM  Airway not difficult    General Information and Staff    Patient location during procedure: OR  CRNA/CAA: Shilpa Fuentes CRNA    Indications and Patient Condition  Indications for airway management: airway protection    Preoxygenated: yes    Mask difficulty assessment: 2 - vent by mask + OA or adjuvant +/- NMBA    Final Airway Details    Final airway type: endotracheal airway      Successful airway: ETT  Cuffed: yes   Successful intubation technique: direct laryngoscopy  Adjuncts used in placement: intubating stylet  Endotracheal tube insertion site: oral  Blade: Koroma  Blade size: 3  ETT size (mm): 7.5  Cormack-Lehane Classification: grade IIa - partial view of glottis  Placement verified by: chest auscultation   Cuff volume (mL): 7  Measured from: gums  ETT/EBT to gums (cm): 20  Number of attempts at approach: 1  Assessment: lips, teeth, and gum same as pre-op and atraumatic intubation

## 2025-04-25 NOTE — PLAN OF CARE
Goal Outcome Evaluation:      Patient resting in bed, VSS, RA, patient had emesis and treated with zofran. Medicated for pain as needed. Dressing to lower back intact with serosanginous drainage noted. Neuro checks WNL, no c/o numbness or tingeling. Torres cath draining clear yellow urine. All safety maintained and call light in reach.

## 2025-04-25 NOTE — ANESTHESIA POSTPROCEDURE EVALUATION
Patient: Kemi Lorenzana    Procedure Summary       Date: 04/25/25 Room / Location:  [facility] OR [address] [place] [address] [facility]    Anesthesia Start: 0721 Anesthesia Stop: 1155    Procedure: DIRECT LATERAL FUSION WITH POSTERIOR PEDICLE SCREW instrumentation L2/3 and L3/4 with pedicle screws from L2-L5 (Bilateral: Spine Lumbar) Diagnosis:       Scoliosis of lumbar region due to degenerative disease of spine in adult      Spinal stenosis, lumbar region, with neurogenic claudication      (Scoliosis of lumbar region due to degenerative disease of spine in adult [M41.56])      (Spinal stenosis, lumbar region, with neurogenic claudication [M48.062])    Surgeons: Roland Glez DO Provider: Shilpa Fuentes CRNA    Anesthesia Type: general ASA Status: 3            Anesthesia Type: general    Vitals  Vitals Value Taken Time   /68 04/25/25 13:19   Temp 97.5 °F (36.4 °C) 04/25/25 11:53   Pulse 84 04/25/25 13:20   Resp 16 04/25/25 13:10   SpO2 96 % 04/25/25 13:20   Vitals shown include unfiled device data.        Post Anesthesia Care and Evaluation    Patient location during evaluation: bedside  Patient participation: complete - patient participated  Level of consciousness: awake and alert  Pain management: adequate    Airway patency: patent  Anesthetic complications: No anesthetic complications  PONV Status: none  Cardiovascular status: acceptable  Respiratory status: acceptable  Hydration status: acceptable    Comments: Pt discharged from PACU based on sonya score >8       No anesthesia care post op

## 2025-04-25 NOTE — ANESTHESIA PREPROCEDURE EVALUATION
Anesthesia Evaluation     no history of anesthetic complications:   NPO Solid Status: > 8 hours  NPO Liquid Status: > 8 hours           Airway   Mallampati: I  No difficulty expected  Dental      Pulmonary    (+) a smoker (quit a few years ago) Former, vape,  (-) sleep apnea  Cardiovascular   Exercise tolerance: good (4-7 METS)    (+) hypertension  (-) CAD      Neuro/Psych  (-) seizures, TIA, CVA  GI/Hepatic/Renal/Endo    (+) GERD, renal disease- CRI  (-) liver disease, diabetes    Musculoskeletal     Abdominal    Substance History      OB/GYN          Other   arthritis,                 Anesthesia Plan    ASA 3     general     intravenous induction     Anesthetic plan, risks, benefits, and alternatives have been provided, discussed and informed consent has been obtained with: patient.    CODE STATUS:

## 2025-04-26 VITALS
WEIGHT: 218.92 LBS | BODY MASS INDEX: 37.37 KG/M2 | TEMPERATURE: 97.5 F | RESPIRATION RATE: 18 BRPM | OXYGEN SATURATION: 98 % | DIASTOLIC BLOOD PRESSURE: 52 MMHG | HEART RATE: 74 BPM | SYSTOLIC BLOOD PRESSURE: 118 MMHG

## 2025-04-26 LAB — GLUCOSE BLDC GLUCOMTR-MCNC: 112 MG/DL (ref 70–130)

## 2025-04-26 PROCEDURE — 97165 OT EVAL LOW COMPLEX 30 MIN: CPT

## 2025-04-26 PROCEDURE — 97161 PT EVAL LOW COMPLEX 20 MIN: CPT

## 2025-04-26 PROCEDURE — 82948 REAGENT STRIP/BLOOD GLUCOSE: CPT

## 2025-04-26 RX ORDER — CEPHALEXIN 500 MG/1
500 CAPSULE ORAL 4 TIMES DAILY
Qty: 4 CAPSULE | Refills: 0 | Status: SHIPPED | OUTPATIENT
Start: 2025-04-26 | End: 2025-04-27

## 2025-04-26 RX ORDER — OXYCODONE AND ACETAMINOPHEN 7.5; 325 MG/1; MG/1
1 TABLET ORAL EVERY 6 HOURS PRN
Qty: 20 TABLET | Refills: 0 | Status: SHIPPED | OUTPATIENT
Start: 2025-04-26 | End: 2025-05-01

## 2025-04-26 RX ORDER — KETOROLAC TROMETHAMINE 10 MG/1
10 TABLET, FILM COATED ORAL EVERY 6 HOURS PRN
Qty: 12 TABLET | Refills: 0 | Status: SHIPPED | OUTPATIENT
Start: 2025-04-26

## 2025-04-26 RX ADMIN — LISINOPRIL 10 MG: 10 TABLET ORAL at 08:45

## 2025-04-26 RX ADMIN — CYCLOBENZAPRINE HYDROCHLORIDE 10 MG: 10 TABLET, FILM COATED ORAL at 06:03

## 2025-04-26 RX ADMIN — OXYCODONE HYDROCHLORIDE AND ACETAMINOPHEN 1 TABLET: 7.5; 325 TABLET ORAL at 12:09

## 2025-04-26 RX ADMIN — ATENOLOL 25 MG: 25 TABLET ORAL at 08:45

## 2025-04-26 RX ADMIN — PANTOPRAZOLE SODIUM 40 MG: 40 TABLET, DELAYED RELEASE ORAL at 06:04

## 2025-04-26 RX ADMIN — HYDROCHLOROTHIAZIDE 12.5 MG: 25 TABLET ORAL at 08:45

## 2025-04-26 RX ADMIN — SUCRALFATE 1 G: 1 TABLET ORAL at 11:03

## 2025-04-26 RX ADMIN — SUCRALFATE 1 G: 1 TABLET ORAL at 08:45

## 2025-04-26 RX ADMIN — OXYCODONE HYDROCHLORIDE AND ACETAMINOPHEN 1 TABLET: 7.5; 325 TABLET ORAL at 06:04

## 2025-04-26 NOTE — PLAN OF CARE
Goal Outcome Evaluation:         Patient A&O x4, VSS, RA, Patient fitted for LSO brace. Patient up to chair at bedside and walked in [place] with PT and tolerated well. Medicated for pain as needed. Patient to be discharged home today.         1550: Discharge forms completed and discussed with patient and verbalized understanding. Patient leaving floor per w/c with all personal belongings going home with grandson in personal car.

## 2025-04-26 NOTE — PLAN OF CARE
Goal Outcome Evaluation:           Progress: improving     VSS on RA. A/Ox4. PRN medications for c/o pain. PRN zofran for c/o nausea. Improving. SCDs. Moderate drainage to back, changed dressing. Pulling leon this morning. Call light in reach, safety maintained.

## 2025-04-26 NOTE — PLAN OF CARE
Goal Outcome Evaluation:  Plan of Care Reviewed With: patient        Progress: no change  Outcome Evaluation: OT eval completed. Pt in fowlers upon therapist arrival; A&Ox4; c/o 5/10 low back pain with additional pain in L hip with movement. Pt reports Mod I-I with all BADLs including fxl ambulation at baseline. Today, Pt was educated on spinal precautions, donning/doffing of LSO, and LSO wear schedule; Pt verbalized understanding. Pt performed log roll onto R side and sidelying>sit utilizing bedrail with SBA. Pt adjusted B socks while seated EOB with SBA. Pt donned LSO while seated EOB requiring Max A and verbal/visual cues. Pt performed sit<>stand and ambulated home distance in hallway utilizing HHA with CGA; Pt demos antalgic gait due to back and L hip pain. BUE strength WFL. Skilled OT intervention indicated in order to address deficits in fxl mobility, fxl activity tolerance, balance, and use of adaptive techniques/equipment during performance of BADLs. Recommend home with assist and HH at discharge.    Anticipated Discharge Disposition (OT): home with assist, home with home health

## 2025-04-26 NOTE — THERAPY EVALUATION
Patient Name: Kemi Lorenzana  : 1950    MRN: [id number]                              Today's Date: 2025       Admit Date: 2025    Visit Dx:     ICD-10-CM ICD-9-CM   1. Impaired mobility [Z74.09]  Z74.09 799.89   2. Scoliosis of lumbar region due to degenerative disease of spine in adult  M41.56 737.43   3. Spinal stenosis, lumbar region, with neurogenic claudication  M48.062 724.03     Patient Active Problem List   Diagnosis    DDD (degenerative disc disease), thoracic    Scoliosis of lumbar region due to degenerative disease of spine in adult    Spinal stenosis, lumbar region, with neurogenic claudication     Past Medical History:   Diagnosis Date    Arthritis     Back pain     DDD (degenerative disc disease), lumbar 2025    GERD (gastroesophageal reflux disease)     HTN (hypertension)     Kidney disease, chronic, stage III (GFR 30-59 ml/min)     Scoliosis of lumbar spine 2025    Spinal stenosis, lumbar 2025     Past Surgical History:   Procedure Laterality Date    BACK SURGERY      BUNIONECTOMY      CATARACT EXTRACTION Bilateral     CHOLECYSTECTOMY      HAND SURGERY Right     HYSTERECTOMY      Partial    KNEE SURGERY        General Information       Row Name 25 0733          OT Time and Intention    Subjective Information complains of;pain  -LS     Document Type evaluation  Scoliotic deformity with severe back pain and lateral instability at L3/4 and significant degeneration at L2/3. s/p  DIRECT LATERAL FUSION WITH POSTERIOR PEDICLE SCREW instrumentation L2/3 and L3/4 with pedicle screws from L2-L5  -LS     Mode of Treatment occupational therapy  -LS     Patient Effort good  -LS       Row Name 25 0733          General Information    Patient Profile Reviewed yes  -LS     Prior Level of Function independent:;ADL's;all household mobility;community mobility;home management;cooking;cleaning;driving;shopping  -LS     Existing Precautions/Restrictions fall;spinal;LSO;brace worn  when out of bed  -LS       Row Name 04/26/25 07          Occupational Profile    Environmental Supports and Barriers (Occupational Profile) Tub shower with shower chair. Other AD/DME: rwx  -LS       Row Name 04/26/25 [facility]          Living Environment    Current Living Arrangements home  -LS     People in Home grandchild(jolene)  -LS       Row Name 04/26/25 33          Home Main Entrance    Number of Stairs, Main Entrance four  -LS     Stair Railings, Main Entrance railings on both sides of stairs  -LS       Row Name 04/26/25 [facility]          Stairs Within Home, Primary    Number of Stairs, Within Home, Primary none  -LS       Row Name 04/26/25 [facility]          Cognition    Orientation Status (Cognition) oriented x 4  -LS       Row Name 04/26/25 [facility]          Safety Issues/Impairments Affecting Functional Mobility    Impairments Affecting Function (Mobility) pain;balance;endurance/activity tolerance;strength  -LS               User Key  (r) = Recorded By, (t) = Taken By, (c) = Cosigned By      Initials Name Provider Type    LS Melida Ma OTR/L Occupational Therapist                     Mobility/ADL's       Row Name 04/26/25 [facility]          Bed Mobility    Bed Mobility rolling right;sidelying-sit  -LS     Rolling Right [place] (Bed Mobility) standby assist  -LS     Sidelying-Sit [place] (Bed Mobility) standby assist  -LS     Assistive Device (Bed Mobility) bed rails  -LS       Row Name 04/26/25 33          Transfers    Transfers sit-stand transfer;stand-sit transfer  -LS       Row Name 04/26/25 [facility]          Sit-Stand Transfer    Sit-Stand [place] (Transfers) contact guard  -LS       Row Name 04/26/25 [facility]          Stand-Sit Transfer    Stand-Sit [place] (Transfers) contact guard  -LS       Row Name 04/26/25 [facility]          Functional Mobility    Functional Mobility- Ind. Level contact guard assist  -LS     Functional Mobility- Device other (see comments)  HHA  -LS     Functional Mobility-Distance  (Feet) 75  -LS     Patient was able to Ambulate yes  -LS       Row Name 04/26/25 [facility]          Activities of Daily Living    BADL Assessment/Intervention lower body dressing;upper body dressing  -LS       Row Name 04/26/25 [facility]          Lower Body Dressing Assessment/Training    [place] Level (Lower Body Dressing) don;socks;standby assist  -LS     Position (Lower Body Dressing) edge of bed sitting  -LS       Row Name 04/26/25 [facility]          Upper Body Dressing Assessment/Training    [place] Level (Upper Body Dressing) don;maximum assist (25% patient effort)  -LS     Position (Upper Body Dressing) edge of bed sitting  -LS     Comment, (Upper Body Dressing) LSO  -LS               User Key  (r) = Recorded By, (t) = Taken By, (c) = Cosigned By      Initials Name Provider Type    Melida Padilla OTR/L Occupational Therapist                   Obj/Interventions       Row Name 04/26/25 [facility]          Sensory Assessment (Somatosensory)    Sensory Assessment (Somatosensory) UE sensation intact  -       Row Name 04/26/25 [facility]          Vision Assessment/Intervention    Visual Impairment/Limitations WFL;corrective lenses for reading  -LS       Row Name 04/26/25 [facility]          Range of Motion Comprehensive    General Range of Motion bilateral upper extremity ROM WFL  -LS       Row Name 04/26/25 [facility]          Strength Comprehensive (MMT)    Comment, General Manual Muscle Testing (MMT) Assessment BUE strength grossly 4+/5  -LS       Row Name 04/26/25 [facility]          Balance    Balance Assessment sitting static balance;sitting dynamic balance;standing static balance;standing dynamic balance  -LS     Static Sitting Balance independent  -LS     Dynamic Sitting Balance independent  -LS     Position, Sitting Balance unsupported;sitting edge of bed  -LS     Static Standing Balance contact guard  -LS     Dynamic Standing Balance contact guard  -LS     Position/Device Used, Standing Balance supported;other (see  comments);unsupported  [facility]  -LS               User Key  (r) = Recorded By, (t) = Taken By, (c) = Cosigned By      Initials Name Provider Type    Melida Padilla OTR/L Occupational Therapist                   Goals/Plan       Row Name 04/26/25 0733          Transfer Goal 1 (OT)    Activity/Assistive Device (Transfer Goal 1, OT) toilet;tub  -LS     [place] Level/Cues Needed (Transfer Goal 1, OT) supervision required  -LS     Time Frame (Transfer Goal 1, OT) long term goal (LTG);10 days  -LS     Progress/Outcome (Transfer Goal 1, OT) new goal  -LS       Row Name 04/26/25 0733          Dressing Goal 1 (OT)    Activity/Device (Dressing Goal 1, OT) dressing skills, all  -LS     [place]/Cues Needed (Dressing Goal 1, OT) modified independence  -LS     Time Frame (Dressing Goal 1, OT) long term goal (LTG);10 days  -LS     Progress/Outcome (Dressing Goal 1, OT) new goal  -LS       Row Name 04/26/25 0733          Toileting Goal 1 (OT)    Activity/Device (Toileting Goal 1, OT) toileting skills, all  -LS     [place] Level/Cues Needed (Toileting Goal 1, OT) modified independence  -LS     Time Frame (Toileting Goal 1, OT) long term goal (LTG);10 days  -LS     Progress/Outcome (Toileting Goal 1, OT) new goal  -LS       Row Name 04/26/25 0733          Problem Specific Goal 1 (OT)    Problem Specific Goal 1 (OT) Pt will independently implement one pain management technique to decrease pain and improve functional adl performance.  -LS     Time Frame (Problem Specific Goal 1, OT) long term goal (LTG);by discharge  -LS     Progress/Outcome (Problem Specific Goal 1, OT) new goal  -LS       Row Name 04/26/25 0733          Therapy Assessment/Plan (OT)    Planned Therapy Interventions (OT) transfer/mobility retraining;strengthening exercise;patient/caregiver education/training;occupation/activity based interventions;functional balance retraining;activity tolerance training;BADL retraining;adaptive equipment  training;ROM/therapeutic exercise;orthotic fabrication/fitting/training  -               User Key  (r) = Recorded By, (t) = Taken By, (c) = Cosigned By      Initials Name Provider Type    Melida Padilla OTR/L Occupational Therapist                   Clinical Impression       Row Name 04/26/25 0733          Pain Assessment    Pretreatment Pain Rating 5/10  -LS     Posttreatment Pain Rating 7/10  -LS     Pain Location back;hip  -LS     Pain Side/Orientation left  -LS     Pain Management Interventions exercise or physical activity utilized  -LS     Response to Pain Interventions activity participation with increased pain  -LS       Row Name 04/26/25 0733          Plan of Care Review    Plan of Care Reviewed With patient  -LS     Progress no change  -LS     Outcome Evaluation OT eval completed. Pt in fowlers upon therapist arrival; A&Ox4; c/o 5/10 low back pain with additional pain in L hip with movement. Pt reports Mod I-I with all BADLs including fxl ambulation at baseline. Today, Pt was educated on spinal precautions, donning/doffing of LSO, and LSO wear schedule; Pt verbalized understanding. Pt performed log roll onto R side and sidelying>sit utilizing bedrail with SBA. Pt adjusted B socks while seated EOB with SBA. Pt donned LSO while seated EOB requiring Max A and verbal/visual cues. Pt performed sit<>stand and ambulated home distance in hallway utilizing HHA with CGA; Pt demos antalgic gait due to back and L hip pain. BUE strength WFL. Skilled OT intervention indicated in order to address deficits in fxl mobility, fxl activity tolerance, balance, and use of adaptive techniques/equipment during performance of BADLs. Recommend home with assist and HH at discharge.  -       Row Name 04/26/25 0733          Therapy Assessment/Plan (OT)    Rehab Potential (OT) good  -LS     Criteria for Skilled Therapeutic Interventions Met (OT) yes;skilled treatment is necessary  -     Therapy Frequency (OT) 5 times/wk  -        Row Name 04/26/25 0733          Therapy Plan Review/Discharge Plan (OT)    Anticipated Discharge Disposition (OT) home with assist;home with home health  -LS       Row Name 04/26/25 0733          Positioning and Restraints    Pre-Treatment Position in bed  -LS     Post Treatment Position chair  -LS     In Chair sitting;call light within reach;encouraged to call for assist;with brace  -LS               User Key  (r) = Recorded By, (t) = Taken By, (c) = Cosigned By      Initials Name Provider Type    LS Melida Ma OTR/L Occupational Therapist                   Outcome Measures       Row Name 04/26/25 0733          How much help from another is currently needed...    Putting on and taking off regular lower body clothing? 3  -LS     Bathing (including washing, rinsing, and drying) 3  -LS     Toileting (which includes using toilet bed pan or urinal) 3  -LS     Putting on and taking off regular upper body clothing 3  -LS     Taking care of personal grooming (such as brushing teeth) 4  -LS     Eating meals 4  -LS     AM-PAC 6 Clicks Score (OT) 20  -LS       Row Name 04/26/25 0735 04/25/25 2032       How much help from another person do you currently need...    Turning from your back to your side while in flat bed without using bedrails? 3  -LH 3  -SH    Moving from lying on back to sitting on the side of a flat bed without bedrails? 3  -LH 3  -SH    Moving to and from a bed to a chair (including a wheelchair)? 3  -LH 3  -SH    Standing up from a chair using your arms (e.g., wheelchair, bedside chair)? 4  - 3  -SH    Climbing 3-5 steps with a railing? 3  -LH 2  -SH    To walk in hospital room? 3  -LH 2  -SH    AM-PAC 6 Clicks Score (PT) 19  -LH 16  -SH    Highest Level of Mobility Goal 6 --> Walk 10 steps or more  -LH 5 --> Static standing  -SH      Row Name 04/26/25 0735 04/26/25 0733       Functional Assessment    Outcome Measure Options AM-PAC 6 Clicks Basic Mobility (PT)  - AM-PAC 6 Clicks Daily Activity  (OT)  -              User Key  (r) = Recorded By, (t) = Taken By, (c) = Cosigned By      Initials Name Provider Type    LH Jose Mao, PT Physical Therapist    Melida Padilla, OTR/L Occupational Therapist    SH Holstein, Sarai, RN Registered Nurse                    Occupational Therapy Education       Title: PT OT SLP Therapies (In Progress)       Topic: Occupational Therapy (In Progress)       Point: ADL training (Done)       Learning Progress Summary            Patient Acceptance, E, VU by  at 4/26/2025 0815                      Point: Precautions (Done)       Learning Progress Summary            Patient Acceptance, E, VU by  at 4/26/2025 0815                      Point: Body mechanics (Done)       Learning Progress Summary            Patient Acceptance, E, VU by  at 4/26/2025 0815                                      User Key       Initials Effective Dates Name Provider Type Discipline     06/20/22 -  Melida Ma, OTR/L Occupational Therapist OT                  OT Recommendation and Plan  Planned Therapy Interventions (OT): transfer/mobility retraining, strengthening exercise, patient/caregiver education/training, occupation/activity based interventions, functional balance retraining, activity tolerance training, BADL retraining, adaptive equipment training, ROM/therapeutic exercise, orthotic fabrication/fitting/training  Therapy Frequency (OT): 5 times/wk  Plan of Care Review  Plan of Care Reviewed With: patient  Progress: no change  Outcome Evaluation: OT eval completed. Pt in fowlers upon therapist arrival; A&Ox4; c/o 5/10 low back pain with additional pain in L hip with movement. Pt reports Mod I-I with all BADLs including fxl ambulation at baseline. Today, Pt was educated on spinal precautions, donning/doffing of LSO, and LSO wear schedule; Pt verbalized understanding. Pt performed log roll onto R side and sidelying>sit utilizing bedrail with SBA. Pt adjusted B socks while seated EOB with  SBA. Pt donned LSO while seated EOB requiring Max A and verbal/visual cues. Pt performed sit<>stand and ambulated home distance in hallway utilizing HHA with CGA; Pt demos antalgic gait due to back and L hip pain. BUE strength WFL. Skilled OT intervention indicated in order to address deficits in fxl mobility, fxl activity tolerance, balance, and use of adaptive techniques/equipment during performance of BADLs. Recommend home with assist and HH at discharge.     Time Calculation:         Time Calculation- OT       Row Name 04/26/25 0733             Time Calculation- OT    OT Start Time 0733  -      OT Stop Time 0811  -      OT Time Calculation (min) 38 min  -      OT Non-Billable Time (min) 38 min  -      OT Received On 04/26/25  -      OT Goal Re-Cert Due Date 05/06/25  -                User Key  (r) = Recorded By, (t) = Taken By, (c) = Cosigned By      Initials Name Provider Type     Melida Ma OTR/L Occupational Therapist                  Therapy Charges for Today       Code Description Service Date Service Provider Modifiers Qty    [id number]  OT EVAL LOW COMPLEXITY 3 4/26/2025 Melida Ma, OTR/L GO 1                 Melida Ma OTR/L  4/26/2025

## 2025-04-26 NOTE — THERAPY EVALUATION
Patient Name: Kemi Lorenzana  : 1950    MRN: [id number]                              Today's Date: 2025       Admit Date: 2025    Visit Dx:     ICD-10-CM ICD-9-CM   1. Impaired mobility [Z74.09]  Z74.09 799.89   2. Scoliosis of lumbar region due to degenerative disease of spine in adult  M41.56 737.43   3. Spinal stenosis, lumbar region, with neurogenic claudication  M48.062 724.03     Patient Active Problem List   Diagnosis    DDD (degenerative disc disease), thoracic    Scoliosis of lumbar region due to degenerative disease of spine in adult    Spinal stenosis, lumbar region, with neurogenic claudication     Past Medical History:   Diagnosis Date    Arthritis     Back pain     DDD (degenerative disc disease), lumbar 2025    GERD (gastroesophageal reflux disease)     HTN (hypertension)     Kidney disease, chronic, stage III (GFR 30-59 ml/min)     Scoliosis of lumbar spine 2025    Spinal stenosis, lumbar 2025     Past Surgical History:   Procedure Laterality Date    BACK SURGERY      BUNIONECTOMY      CATARACT EXTRACTION Bilateral     CHOLECYSTECTOMY      HAND SURGERY Right     HYSTERECTOMY      Partial    KNEE SURGERY        General Information       Row Name 25 0735          Physical Therapy Time and Intention    Document Type evaluation  DIRECT LATERAL FUSION WITH POSTERIOR PEDICLE SCREW instrumentation L2/3 and L3/4 with pedicle screws from L2-L5 25     Mode of Treatment co-treatment;physical therapy  -       Row Name 25 0735          General Information    Patient Profile Reviewed yes  -     Prior Level of Function independent:;community mobility;ADL's;cooking;cleaning;driving;shopping  DME:  RW.  walk in shower  -     Existing Precautions/Restrictions fall;brace worn when out of bed;LSO  -     Barriers to Rehab none identified  -       Row Name 25 0735          Living Environment    Current Living Arrangements home  -     People in Home  grandchild(jolene)  -       Row Name 04/26/25 0735          Home Main Entrance    Number of Stairs, Main Entrance four  -     Stair Railings, Main Entrance railings on both sides of stairs  -       Row Name 04/26/25 0735          Stairs Within Home, Primary    Number of Stairs, Within Home, Primary none  -       Row Name 04/26/25 0735          Cognition    Orientation Status (Cognition) oriented x 4  -       Row Name 04/26/25 0735          Safety Issues/Impairments Affecting Functional Mobility    Safety Issues Affecting Function (Mobility) ability to follow commands  -     Impairments Affecting Function (Mobility) pain;balance;endurance/activity tolerance  -               User Key  (r) = Recorded By, (t) = Taken By, (c) = Cosigned By      Initials Name Provider Type     Jose Mao, PT Physical Therapist                   Mobility       Row Name 04/26/25 0735          Bed Mobility    Bed Mobility rolling right;sidelying-sit  -     Rolling Right [place] (Bed Mobility) standby assist  -     Sidelying-Sit [place] (Bed Mobility) standby assist  -     Assistive Device (Bed Mobility) bed rails  -       Row Name 04/26/25 0735          Sit-Stand Transfer    Sit-Stand [place] (Transfers) contact guard  -       Row Name 04/26/25 0735          Gait/Stairs (Locomotion)    [place] Level (Gait) minimum assist (75% patient effort)  -     Patient was able to Ambulate yes  -     Distance in Feet (Gait) 50  -     Deviations/Abnormal Patterns (Gait) left sided deviations;antalgic  decreased stance phase L side  -     Bilateral Gait Deviations decreased arm swing  -               User Key  (r) = Recorded By, (t) = Taken By, (c) = Cosigned By      Initials Name Provider Type     Jose Mao, PT Physical Therapist                   Obj/Interventions       Row Name 04/26/25 0735          Range of Motion Comprehensive    General Range of Motion bilateral lower extremity ROM WFL   -       Row Name 04/26/25 0735          Strength Comprehensive (MMT)    General Manual Muscle Testing (MMT) Assessment no strength deficits identified  -       Row Name 04/26/25 0735          Sensory Assessment (Somatosensory)    Sensory Assessment (Somatosensory) sensation intact  -               User Key  (r) = Recorded By, (t) = Taken By, (c) = Cosigned By      Initials Name Provider Type     Jose Mao, PT Physical Therapist                   Goals/Plan       Row Name 04/26/25 0735          Bed Mobility Goal 1 (PT)    Activity/Assistive Device (Bed Mobility Goal 1, PT) rolling to left;rolling to right;sidelying to sit/sit to sidelying  -     [place] Level/Cues Needed (Bed Mobility Goal 1, PT) independent  -     Time Frame (Bed Mobility Goal 1, PT) 10 days  -     Progress/Outcomes (Bed Mobility Goal 1, PT) new goal  -       Row Name 04/26/25 0735          Transfer Goal 1 (PT)    Activity/Assistive Device (Transfer Goal 1, PT) sit-to-stand/stand-to-sit  -     [place] Level/Cues Needed (Transfer Goal 1, PT) independent  -     Time Frame (Transfer Goal 1, PT) 10 days  -     Progress/Outcome (Transfer Goal 1, PT) new goal  -       Row Name 04/26/25 0735          Gait Training Goal 1 (PT)    Activity/Assistive Device (Gait Training Goal 1, PT) gait (walking locomotion);assistive device use;decrease asymmetrical patterns  -     [place] Level (Gait Training Goal 1, PT) standby assist  -     Distance (Gait Training Goal 1, PT) 150ft with less than 4/10 pain  -     Time Frame (Gait Training Goal 1, PT) 10 days  -     Progress/Outcome (Gait Training Goal 1, PT) new goal  -       Row Name 04/26/25 0735          Stairs Goal 1 (PT)    Activity/Assistive Device (Stairs Goal 1, PT) ascending stairs;descending stairs;using handrail, left;using handrail, right  -     [place] Level/Cues Needed (Stairs Goal 1, PT) contact guard required  -     Number of Stairs (Stairs  Goal 1, PT) 4  -     Time Frame (Stairs Goal 1, PT) 10 days  -     Progress/Outcome (Stairs Goal 1, PT) new goal  -       Row Name 04/26/25 0735          Therapy Assessment/Plan (PT)    Planned Therapy Interventions (PT) bed mobility training;gait training;stair training;prosthetic fitting/training;postural re-education;patient/family education;transfer training  -               User Key  (r) = Recorded By, (t) = Taken By, (c) = Cosigned By      Initials Name Provider Type     Jose Mao, PT Physical Therapist                   Clinical Impression       Row Name 04/26/25 0735          Pain    Pretreatment Pain Rating 5/10  -     Posttreatment Pain Rating 7/10  -     Pain Location back  -     Pain Side/Orientation lower  -     Pain Management Interventions exercise or physical activity utilized  -     Response to Pain Interventions activity participation with decreased pain  -       Row Name 04/26/25 0735          Plan of Care Review    Plan of Care Reviewed With patient  -     Outcome Evaluation PT IE complete.  A&Ox4.  C/o 7/10 LBP following mobility.  Pt reports she is independent at baseline.  Today she is SBA bed mobility with handrails.  CGA sit<>stand.  Ambulated 50ft min A x1 with antalgic limp L side.  Pt would benefit from temorary use of RW until limp subsides.  PT to see for progressive ambulation and stair training.  Recommend home with family at [place].  Thank you for referral.  -       Row Name 04/26/25 0735          Therapy Assessment/Plan (PT)    Patient/Family Therapy Goals Statement (PT) return home  -     Rehab Potential (PT) good  -     Criteria for Skilled Interventions Met (PT) yes;skilled treatment is necessary  -     Therapy Frequency (PT) 2 times/day  -     Predicted Duration of Therapy Intervention (PT) until GA  -       Row Name 04/26/25 0735          Positioning and Restraints    Pre-Treatment Position in bed  -     Post Treatment Position chair  -      In Chair sitting;call light within reach;encouraged to call for assist;with OT  -               User Key  (r) = Recorded By, (t) = Taken By, (c) = Cosigned By      Initials Name Provider Type     Jose Mao PT Physical Therapist                   Outcome Measures       Row Name 04/26/25 0735 04/25/25 2032       How much help from another person do you currently need...    Turning from your back to your side while in flat bed without using bedrails? 3  - 3  -SH    Moving from lying on back to sitting on the side of a flat bed without bedrails? 3  - 3  -SH    Moving to and from a bed to a chair (including a wheelchair)? 3  - 3  -SH    Standing up from a chair using your arms (e.g., wheelchair, bedside chair)? 4  - 3  -SH    Climbing 3-5 steps with a railing? 3  - 2  -SH    To walk in hospital room? 3  - 2  -SH    AM-PAC 6 Clicks Score (PT) 19  - 16  -    Highest Level of Mobility Goal 6 --> Walk 10 steps or more  - 5 --> Static standing  -      Row Name 04/26/25 0735          Functional Assessment    Outcome Measure Options AM-PAC 6 Clicks Basic Mobility (PT)  -               User Key  (r) = Recorded By, (t) = Taken By, (c) = Cosigned By      Initials Name Provider Type     Jose Mao PT Physical Therapist     Holstein, Sarai, RN Registered Nurse                                 Physical Therapy Education       Title: PT OT SLP Therapies (Done)       Topic: Physical Therapy (Done)       Point: Mobility training (Done)       Learning Progress Summary            Patient Acceptance, E,D, DU,VU by  at 4/26/2025 0811    Comment: benefits of PT and POC, call for A OOB, LSO when OOB                      Point: Body mechanics (Done)       Learning Progress Summary            Patient Acceptance, E,D, DU,VU by  at 4/26/2025 0811    Comment: benefits of PT and POC, call for A OOB, LSO when OOB                      Point: Precautions (Done)       Learning Progress Summary             Patient Acceptance, E,D, DU,VU by  at 4/26/2025 0811    Comment: benefits of PT and POC, call for A OOB, LSO when OOB                                      User Key       Initials Effective Dates Name Provider Type Discipline     02/03/23 -  Jose Mao, PT Physical Therapist PT                  PT Recommendation and Plan  Planned Therapy Interventions (PT): bed mobility training, gait training, stair training, prosthetic fitting/training, postural re-education, patient/family education, transfer training  Outcome Evaluation: PT IE complete.  A&Ox4.  C/o 7/10 LBP following mobility.  Pt reports she is independent at baseline.  Today she is SBA bed mobility with handrails.  CGA sit<>stand.  Ambulated 50ft min A x1 with antalgic limp L side.  Pt would benefit from temorary use of RW until limp subsides.  PT to see for progressive ambulation and stair training.  Recommend home with family at [place].  Thank you for referral.     Time Calculation:         PT Charges       Row Name 04/26/25 0813             Time Calculation    Start Time 0735  -      Stop Time 0813  -      Time Calculation (min) 38 min  -      PT Received On 04/26/25  -      PT Goal Re-Cert Due Date 05/06/25  -         Untimed Charges    PT Eval/Re-eval Minutes 38  -         Total Minutes    Untimed Charges Total Minutes 38  -       Total Minutes 38  -                User Key  (r) = Recorded By, (t) = Taken By, (c) = Cosigned By      Initials Name Provider Type     Jose Mao, PT Physical Therapist                  Therapy Charges for Today       Code Description Service Date Service Provider Modifiers Qty    [id number] HC PT EVAL LOW COMPLEXITY 3 4/26/2025 Jose Mao, PT GP 1            PT G-Codes  Outcome Measure Options: AM-PAC 6 Clicks Basic Mobility (PT)  AM-PAC 6 Clicks Score (PT): 19  PT Discharge Summary  Anticipated Discharge Disposition (PT): home with assist    Jose Mao PT  4/26/2025

## 2025-04-26 NOTE — PLAN OF CARE
Problem: Adult Inpatient Plan of Care  Goal: Plan of Care Review  Recent Flowsheet Documentation  Taken 4/26/2025 0735 by Jose Mao, PT  Outcome Evaluation: PT IE complete.  A&Ox4.  C/o 7/10 LBP following mobility.  Pt reports she is independent at baseline.  Today she is SBA bed mobility with handrails.  CGA sit<>stand.  Ambulated 50ft min A x1 with antalgic limp L side.  Pt would benefit from temorary use of RW until limp subsides.  PT to see for progressive ambulation and stair training.  Recommend home with family at [place].  Thank you for referral.  Plan of Care Reviewed With: patient   Goal Outcome Evaluation:  Plan of Care Reviewed With: patient           Outcome Evaluation: PT IE complete.  A&Ox4.  C/o 7/10 LBP following mobility.  Pt reports she is independent at baseline.  Today she is SBA bed mobility with handrails.  CGA sit<>stand.  Ambulated 50ft min A x1 with antalgic limp L side.  Pt would benefit from temorary use of RW until limp subsides.  PT to see for progressive ambulation and stair training.  Recommend home with family at [place].  Thank you for referral.    Anticipated Discharge Disposition (PT): home with assist

## 2025-04-26 NOTE — PROGRESS NOTES
Kemi Lorenzana  74 y.o.      Chief complaint:   Back pain      Subjective  Kemi is doing quite well today and has been up with physical therapy and has expected back pain for this type of surgery.  She would like to go home today which I think is reasonable.    Temp:  [97.4 °F (36.3 °C)-98.5 °F (36.9 °C)] 97.5 °F (36.4 °C)  Heart Rate:  [68-77] 74  Resp:  [18] 18  BP: (118-137)/(51-62) 118/52      Objective:  Vital signs: (most recent): Blood pressure 118/52, pulse 74, temperature 97.5 °F (36.4 °C), temperature source Oral, resp. rate 18, weight 99.3 kg (218 lb 14.7 oz), SpO2 98%.        Neurological Exam alert and oriented x 3, cranial nerves II through XII are grossly intact and patient is moving all extremities.  Left hip flexion weakness    Lab Results (last 24 hours)       Procedure Component Value Units Date/Time    POC Glucose Once [[id number]]  (Normal) Collected: 04/26/25 0749    Specimen: Blood Updated: 04/26/25 0800     Glucose 112 mg/dL      Comment: : [id number] Deonte Carr AnnMeter ID: [id number]       POC Glucose Once [[id number]]  (Abnormal) Collected: 04/25/25 1700    Specimen: Blood Updated: 04/25/25 1711     Glucose 143 mg/dL      Comment: : [id number] Deonte Carr AnnMeter ID: [id number]                   Plan:   Status post direct lateral interbody fusion with revision of instrumentation, doing well will plan for discharge today.      Scoliosis of lumbar region due to degenerative disease of spine in adult    Spinal stenosis, lumbar region, with neurogenic claudication        Roland Glez, DO

## 2025-04-26 NOTE — DISCHARGE INSTRUCTIONS
May remove dressing in 48 hours  May shower in 48 hours  No lifting over 10 pounds  Brace when out of bed  Do not submerge wounds  Keep Steri-Strips intact  Avoid bending and twisting  Follow-up in office in 2 weeks

## 2025-04-27 NOTE — THERAPY DISCHARGE NOTE
Acute Care - Physical Therapy Discharge Summary  [facility]       Patient Name: Kemi Lorenzana  : 1950  MRN: [id number]    Today's Date: 2025                 Admit Date: 2025      PT Recommendation and Plan    Visit Dx:    ICD-10-CM ICD-9-CM   1. Impaired mobility [Z74.09]  Z74.09 799.89   2. Scoliosis of lumbar region due to degenerative disease of spine in adult  M41.56 737.43   3. Spinal stenosis, lumbar region, with neurogenic claudication  M48.062 724.03                PT Rehab Goals       Row Name 25 1604             Bed Mobility Goal 1 (PT)    Activity/Assistive Device (Bed Mobility Goal 1, PT) rolling to left;rolling to right;sidelying to sit/sit to sidelying  -NW      [place] Level/Cues Needed (Bed Mobility Goal 1, PT) independent  -NW      Time Frame (Bed Mobility Goal 1, PT) 10 days  -NW      Progress/Outcomes (Bed Mobility Goal 1, PT) goal not met  -NW         Transfer Goal 1 (PT)    Activity/Assistive Device (Transfer Goal 1, PT) sit-to-stand/stand-to-sit  -NW      [place] Level/Cues Needed (Transfer Goal 1, PT) independent  -NW      Time Frame (Transfer Goal 1, PT) 10 days  -NW      Progress/Outcome (Transfer Goal 1, PT) new goal  -NW         Gait Training Goal 1 (PT)    Activity/Assistive Device (Gait Training Goal 1, PT) gait (walking locomotion);assistive device use;decrease asymmetrical patterns  -NW      [place] Level (Gait Training Goal 1, PT) standby assist  -NW      Distance (Gait Training Goal 1, PT) 150ft with less than 4/10 pain  -NW      Time Frame (Gait Training Goal 1, PT) 10 days  -NW      Progress/Outcome (Gait Training Goal 1, PT) new goal  -NW         Stairs Goal 1 (PT)    Activity/Assistive Device (Stairs Goal 1, PT) ascending stairs;descending stairs;using handrail, left;using handrail, right  -NW      [place] Level/Cues Needed (Stairs Goal 1, PT) contact guard required  -NW      Number of Stairs (Stairs Goal 1, PT) 4  -NW      Time Frame  (Stairs Goal 1, PT) 10 days  -NW      Progress/Outcome (Stairs Goal 1, PT) new goal  -NW                User Key  (r) = Recorded By, (t) = Taken By, (c) = Cosigned By      Initials Name Provider Type Discipline    Khushi Jones PTA Physical Therapist Assistant PT                        PT Discharge Summary  Anticipated Discharge Disposition (PT): home  Reason for Discharge: Discharge from facility  Outcomes Achieved: Discharge from facility occurred on same date as evluation  Discharge Destination: Home      Khushi Herrera PTA   4/27/2025

## 2025-04-27 NOTE — THERAPY DISCHARGE NOTE
Acute Care - Occupational Therapy Discharge Summary  [facility]     Patient Name: Kemi Lorenzana  : 1950  MRN: [id number]    Today's Date: 2025                 Admit Date: 2025        OT Recommendation and Plan    Visit Dx:    ICD-10-CM ICD-9-CM   1. Impaired mobility [Z74.09]  Z74.09 799.89   2. Scoliosis of lumbar region due to degenerative disease of spine in adult  M41.56 737.43   3. Spinal stenosis, lumbar region, with neurogenic claudication  M48.062 724.03                OT Rehab Goals       Row Name 25 1200             Transfer Goal 1 (OT)    Activity/Assistive Device (Transfer Goal 1, OT) toilet;tub  -LS      [place] Level/Cues Needed (Transfer Goal 1, OT) supervision required  -LS      Time Frame (Transfer Goal 1, OT) long term goal (LTG);10 days  -LS      Progress/Outcome (Transfer Goal 1, OT) goal not met  -LS         Dressing Goal 1 (OT)    Activity/Device (Dressing Goal 1, OT) dressing skills, all  -LS      [place]/Cues Needed (Dressing Goal 1, OT) modified independence  -LS      Time Frame (Dressing Goal 1, OT) long term goal (LTG);10 days  -LS      Progress/Outcome (Dressing Goal 1, OT) goal not met  -LS         Toileting Goal 1 (OT)    Activity/Device (Toileting Goal 1, OT) toileting skills, all  -LS      [place] Level/Cues Needed (Toileting Goal 1, OT) modified independence  -LS      Time Frame (Toileting Goal 1, OT) long term goal (LTG);10 days  -LS      Progress/Outcome (Toileting Goal 1, OT) goal not met  -LS         Problem Specific Goal 1 (OT)    Problem Specific Goal 1 (OT) Pt will independently implement one pain management technique to decrease pain and improve functional adl performance.  -LS      Time Frame (Problem Specific Goal 1, OT) long term goal (LTG);by discharge  -LS      Progress/Outcome (Problem Specific Goal 1, OT) goal not met  -LS                User Key  (r) = Recorded By, (t) = Taken By, (c) = Cosigned By      Initials Name Provider Type  Discipline    LS Angelique [place] COTA Occupational Therapist Assistant THERAPIES                                OT Discharge Summary  Anticipated Discharge Disposition (OT): home with assist, home with home health  Reason for Discharge: Discharge from facility  Outcomes Achieved: Refer to plan of care for updates on goals achieved  Discharge Destination: Home with home health, Home with assist      KARO Fletcher  4/27/2025

## 2025-04-28 ENCOUNTER — TELEPHONE (OUTPATIENT)
Dept: NEUROSURGERY | Facility: CLINIC | Age: 75
End: 2025-04-28
Payer: MEDICARE

## 2025-04-28 NOTE — OP NOTE
NEUROSURGERY OPERATIVE NOTE    Kemi Lorenzana  OR Date: 4/25/2025    Pre-op Diagnosis:   Scoliosis of lumbar region due to degenerative disease of spine in adult [M41.56]  Spinal stenosis, lumbar region, with neurogenic claudication [M48.062]    Post-op Diagnosis:     Post-Op Diagnosis Codes:     * Scoliosis of lumbar region due to degenerative disease of spine in adult [M41.56]     * Spinal stenosis, lumbar region, with neurogenic claudication [M48.062]          Surgeon(s):  Roland Glez DO    Anesthesia: General    Staff:   Circulator: Georgiana Gao RN; Kirk Bernal RN; Sin Dowell RN  Scrub Person: Lianet Harding; Amaris Taylor; Sugar Moody; Blanca Yu  Vendor Representative: Izzy Bennett; Connor Soria; Alex Kuhn    Procedure(s):  DIRECT LATERAL FUSION WITH POSTERIOR PEDICLE SCREW instrumentation L2/3 and L3/4 with pedicle screws from L2-L5    Spinal Surgery Levels Completed:2 Levels        Estimated Blood Loss: minimal    Complications: None    Implants:   Implant Name Type Inv. Item Serial No.  Lot No. LRB No. Used Action   HEMOST ABS SURGIFOAM  8X12 10MM - [id number] Implant HEMOST ABS SURGIFOAM  8X12 10MM  [employer]  [employer] J AND J [id number]  1 Implanted   PUTTY DBM DIANA 5C - [id number] - [id number] Implant PUTTY DBM DIANA [facility] [id number] [employer] NR  1 Implanted   SPACR LIF ANTERALIGN NANOLOCK TI 6DEG 8X5.9X50MM - [id number] Implant SPACR LIF ANTERALIGN NANOLOCK TI 6DEG 8X5.9X50MM  [employer] [id number] N/A 1 Implanted   PLT MINI ANTERALIGN NANOLOCK [id number] OFFST/2.5MM - [id number] Implant PLT MINI ANTERALIGN NANOLOCK [id number] OFFST/2.5MM  [employer] [id number] N/A 1 Implanted   SCRW BONE ANTERALIGN/LS 5X25MM - [id number] Implant SCRW BONE ANTERALIGN/LS 5X25MM  [employer] [id number] N/A 2 Implanted   PLT MINI ANTERALIGN NANOLOCK [id number] OFFST/2.5MM - [id number] Implant PLT MINI ANTERALIGN NANOLOCK [id number] OFFST/2.5MM  [facility] [id number] N/A 1  Implanted   SPACR LIF ANTERALIGN NANOLOCK TI 6DEG 8X5.9X50MM - [id number] Implant SPACR LIF ANTERALIGN NANOLOCK TI 6DEG 8X5.9X50MM  [employer] [id number] N/A 1 Implanted   PLT MINI ANTERALIGN NANOLOCK [id number] OFFST/2.5MM - [id number] Implant PLT MINI ANTERALIGN NANOLOCK [id number] OFFST/2.5MM  [employer]  N/A 1 Implanted   SCRW DIANA SOLERA VOYAGER MAS 7.5X45MM - [id number] Implant SCRW DIANA SOLERA VOYAGER MAS 7.5X45MM  [employer] [id number] N/A 4 Implanted   SCRW DIANA SOLERA VOYAGER MAS 7.5X50MM - [id number] Implant SCRW DIANA SOLERA VOYAGER MAS 7.5X50MM  [employer] [id number] N/A 2 Implanted   SCRW DIANA SOLERA VOYAGER MAS 7.5X50MM - [id number] Implant SCRW DIANA SOLERA VOYAGER MAS 7.5X50MM  [employer] NR N/A 1 Implanted and Explanted   KASSI PERC 4.31A14NB - [id number] Implant KASSI PERC [id number]  [employer] NR N/A 2 Implanted   SCRW ST SOLERA VOYAGER BRKOFF TI 4.75MM - [id number] Implant SCRW ST SOLERA VOYAGER BRKOFF TI 4.75MM  [employer] NR N/A 8 Implanted   8.5x50 screw     NR N/A 3 Implanted       Specimens:                None      Drains:   [REMOVED] Urethral Catheter Silicone 16 Fr. (Removed)   Daily Indications Selected surgeries ( tract, abdomen) 04/25/25 2032   Site Assessment Clean;Skin intact 04/25/25 2032   Collection Container Standard drainage bag 04/25/25 2032   Securement Method Securing device 04/25/25 2032   Catheter care complete Yes 04/25/25 2032   Output (mL) 450 mL 04/26/25 0600       Procedure    Patient is a 74-year-old female who has had a lumbar fusion at L4/5 in the past and unfortunately has developed severe degeneration with stenosis and instability at L2/3 and L3/4.  Because she failed conservative management I did offer her a direct lateral interbody fusion at L2/3 and L3/4 with revision of her instrumentation to run from L2-L5.  I did explain to her the risk and benefits of the procedure and she wished to proceed.  She was brought to the operative suite and put under general anesthetic and once under general  anesthetic she was turned onto the Abdulkadir table and padded all the appropriate points.  She was then prepped and and draped in a sterile fashion with the left side up and we then marked our incision site using AP and lateral fluoroscopy images to make sure that the table was square for our intraoperative images.  We then infiltrated with 1% lidocaine with epinephrine and opened with a 10 blade and then dissected with finger dissection through the external obliques into the retroperitoneal space.  Once we had access to the psoas muscle we used the initial dilator and started at L3/4 and then dilated over the initial dilator so that our retractor was over the lateral L3/4 disc space.  Once this was completed we used the retractor screw to anchor the retractor to L4 and then stimulated to make sure no nerves were in the operative field.  We then cleaned off the disc space at L3/4 and did an annulotomy and an aggressive discectomy using the paddle amilcar pituitary rongeurs and the serrated curettes.  I then sized her for a direct lateral interbody implant which was packed with graft on and we then placed this in the interspace and fixated it with a lateral plate using a 25 mm screw.  We then moved the retractor up to L2/3 and then once again tested for any nerve roots in our field and then cleaned off the lateral disc space at L2/3 and did an annulotomy and an aggressive discectomy using the paddle amilcar pituitary rongeurs and serrated curettes.  We then sized her for a direct lateral interbody graft which was then packed with graft on and placed in the interspace and fixated with a lateral screw.  Once both of the interbody's were in place we did get AP and lateral fluoroscopy images which showed good placement of all instrumentation.  We then irrigated with copious amounts antibiotic saline got meticulous hemostasis with the bipolar cautery and then closed the external obliques with Vicryl and the skin with 2-0  Vicryl Mastisol Steri-Strips Telfa and OpSite.  Patient was then turned off of the table onto a stretcher and then turned back onto the Abdulkadir table for the posterior portion of the case.  We then prepped and draped in a sterile fashion using a 5-minute DuraPrep scrub sterile towels Ioban and sterile drapes and then brought in the AP and lateral fluoroscopy units.  We then marked our incision sites and infiltrated 1% lidocaine with epinephrine and opened with a 10 blade and then used multiple AP and lateral fluoroscopy images to place K wires into L2 bilaterally L3 bilaterally and then snapped these out of the way.  We then dissected down onto the previously placed instrumentation and remove the rods at L4/5 and then also remove the screws.  She did have some wallowing of the screws in L5 and therefore we replaced these with 8.5 millimeter screws and L5 and the 7.5 millimeter screws into L4 and then placed 7.5 millimeter screws into L3 and L2 bilaterally using multiple AP and lateral fluoroscopy images.  Once all of the screws were in place we used the Voyager system to place a arthur from L2-L5 bilaterally and these were locked down with caps screws to the appropriate torque.  We then got final AP and lateral fluoroscopy images which show good placement of all instrumentation.  We then irrigated with copious amounts antibiotic saline got meticulous hemostasis with the bipolar cautery and then closed the fascia with 0 Nurolon and the skin with 2-0 Vicryl Mastisol Steri-Strips Telfa and OpSite.  Patient went to postoperative recovery in stable condition.

## 2025-04-28 NOTE — DISCHARGE SUMMARY
Date of Discharge:  4/28/2025    Discharge Diagnosis: Scoliosis of lumbar region due to degenerative disease of spine in adult [M41.56]  Spinal stenosis, lumbar region, with neurogenic claudication [M48.062]    Presenting Problem/History of Present Illness  Scoliosis of lumbar region due to degenerative disease of spine in adult [M41.56]  Spinal stenosis, lumbar region, with neurogenic claudication [M48.062]       Hospital Course  Patient is a 74 y.o. female presented with Scoliosis of lumbar region due to degenerative disease of spine in adult [M41.56]  Spinal stenosis, lumbar region, with neurogenic claudication [M48.062].  The patient has been through all manner of conservative care without any meaningful improvement. The patient went to the operating room for a direct lateral interbody fusion at L2/3 and L3/4 with revision of posterior pedicle screw instrumentation..  The patient tolerated procedure well.  Currently the patient is ambulating.  The patient is tolerating p.o.  The patient is voiding spontaneously.  It is felt that at this time the patient is stable and suitable to be discharged home.  See orders for discharge instructions and restrictions.  The patient can take the dressing off in 72 hours and can get the wound wet at that time.  The patient is to clean the wound daily with soap and water.  They are to call the office with any drainage from the incision or worsening pain.    Procedures Performed  Procedure(s):  DIRECT LATERAL FUSION WITH POSTERIOR PEDICLE SCREW instrumentation L2/3 and L3/4 with pedicle screws from L2-L5       Consults:   Consults       No orders found from 3/27/2025 to 4/26/2025.              Condition on Discharge: Stable    Vital Signs       Physical Exam:   Physical Exam  Eyes:      General: Lids are normal.      Extraocular Movements: Extraocular movements intact.   Neurological:      Motor: Motor strength is normal.  Psychiatric:         Speech: Speech normal.  Patient Health Questionnaire-9 (PHQ-9)    Over the last 2 weeks, how often have you been bothered by any of the following problems? 1. Little Interest or pleasure in doing things? [] Not at all  [x] Several Days  [] More than half the day  []  Nearly every day    2. Feeling down, depressed or hopeless? [x] Not at all  [] Several Days  [] More than half the day  []  Nearly every day    3. Trouble falling or staying asleep, or sleeping too much? [] Not at all  [] Several Days  [x] More than half the day  []  Nearly every day    4. Feeling tired or having little energy? [] Not at all  [] Several Days  [x] More than half the day  []  Nearly every day    5. Poor apettite or overeating? [x] Not at all  [] Several Days  [] More than half the day  []  Nearly every day    6. Feeling bad about yourself-or that you are a failure or have let yourself or your family down? [] Not at all  [x] Several Days  [] More than half the day  []  Nearly every day    7. Trouble concentrating on things, such as reading the newspaper or watching television? [] Not at all  [] Several Days  [] More than half the day  [x]  Nearly every day    8. Moving or speaking so slowly that other people could have noticed? Or the opposite-being so fidgety or restless that you have been moving around a lot more than usual?   [] Not at all  [x] Several Days  [] More than half the day  []  Nearly every day    9. Thoughts that you would be better off dead or of hurting yourself in some way? [x] Not at all  [] Several Days  [] More than half the day  []  Nearly every day    Total Score: 10 s/s of moderate depression. Pt denied Si or thoughts of self harm. She identified increased anxiety but is not linked with counseling. Pt identified interest and list of options to be provided. If you checked off any problems, how difficult have these problems made it for you to do your work, take care of things at home, or get along with other people?          Neurological Exam  Mental Status  Awake, alert and oriented to person, place and time. Oriented to person, place and time. Speech is normal. Language is fluent with no aphasia. Attention and concentration are normal. Fund of knowledge is appropriate for level of education.    Cranial Nerves  CN III, IV, VI: Extraocular movements intact bilaterally. Normal lids and orbits bilaterally.    Motor   No abnormal involuntary movements. Strength is 5/5 throughout all four extremities.    Gait  Casual gait is normal including stance, stride, and arm swing.          Discharge Disposition  Home or Self Care    Discharge Medications     Discharge Medications        New Medications        Instructions Start Date   ketorolac 10 MG tablet  Commonly known as: TORADOL   10 mg, Oral, Every 6 Hours PRN      oxyCODONE-acetaminophen 7.5-325 MG per tablet  Commonly known as: Percocet   1 tablet, Oral, Every 6 Hours PRN             Continue These Medications        Instructions Start Date   amLODIPine 5 MG tablet  Commonly known as: NORVASC   5 mg, Oral, Daily      atenolol 25 MG tablet  Commonly known as: TENORMIN   25 mg, Oral, Every Morning      Cholecalciferol 125 MCG (5000 UT) tablet   5,000 Units, Oral, Daily      cyclobenzaprine 10 MG tablet  Commonly known as: FLEXERIL   10 mg, Oral, 3 Times Daily PRN      Dupilumab 300 MG/2ML solution prefilled syringe   300 mg, Subcutaneous, Every 14 Days, 2 weeks a month      ezetimibe 10 MG tablet  Commonly known as: ZETIA   10 mg, Oral, Daily      hydroCHLOROthiazide 12.5 MG capsule  Commonly known as: MICROZIDE   12.5 mg, Oral, Every Morning      lisinopril 20 MG tablet  Commonly known as: PRINIVIL,ZESTRIL   20 mg, Oral, 2 Times Daily      metroNIDAZOLE 0.75 % cream  Commonly known as: METROCREAM   1 Application, Topical, 2 Times Daily, Apply to face       omeprazole 40 MG capsule  Commonly known as: priLOSEC   40 mg, Oral, Every Morning      triamcinolone 0.5 % ointment  Commonly  [] Not difficult at all  [] Somewhat Difficult  [x] Very Difficult  []  Extremely Difficult known as: KENALOG   1 Application, Topical, 3 Times Daily PRN             ASK your doctor about these medications        Instructions Start Date   cephalexin 500 MG capsule  Commonly known as: KEFLEX  Ask about: Should I take this medication?   500 mg, Oral, 4 Times Daily               Discharge Diet: As tolerated    Activity at Discharge: No lifting over 10 pounds, avoid bending and twisting    Follow-up Appointments  Future Appointments   Date Time Provider Department Center   5/12/2025  9:45 AM Myriam Larson PA-C [facility]   6/18/2025  1:15 PM Roland Glez DO [facility]         Test Results Pending at Discharge       Roland Glez DO  04/28/25  13:27 CDT    Time: Discharge 20 min

## 2025-04-28 NOTE — TELEPHONE ENCOUNTER
You called in Percocet for patient after surgery on 4/25/25  and it has tylenol in it and it's making her very sick needs something without tylenol ASAP she states

## 2025-04-29 DIAGNOSIS — M41.9 SCOLIOSIS, UNSPECIFIED SCOLIOSIS TYPE, UNSPECIFIED SPINAL REGION: Primary | ICD-10-CM

## 2025-04-29 RX ORDER — OXYCODONE HYDROCHLORIDE 5 MG/1
5 TABLET ORAL EVERY 6 HOURS PRN
Qty: 20 TABLET | Refills: 0 | Status: SHIPPED | OUTPATIENT
Start: 2025-04-29 | End: 2025-05-04

## 2025-05-05 ENCOUNTER — APPOINTMENT (OUTPATIENT)
Dept: ULTRASOUND IMAGING | Facility: HOSPITAL | Age: 75
End: 2025-05-05
Payer: MEDICARE

## 2025-05-05 ENCOUNTER — HOSPITAL ENCOUNTER (EMERGENCY)
Facility: HOSPITAL | Age: 75
Discharge: HOME | End: 2025-05-05
Admitting: FAMILY MEDICINE
Payer: MEDICARE

## 2025-05-05 ENCOUNTER — TELEPHONE (OUTPATIENT)
Dept: NEUROSURGERY | Facility: CLINIC | Age: 75
End: 2025-05-05
Payer: MEDICARE

## 2025-05-05 ENCOUNTER — APPOINTMENT (OUTPATIENT)
Dept: CT IMAGING | Facility: HOSPITAL | Age: 75
End: 2025-05-05
Payer: MEDICARE

## 2025-05-05 VITALS
RESPIRATION RATE: 20 BRPM | WEIGHT: 222.5 LBS | HEART RATE: 67 BPM | DIASTOLIC BLOOD PRESSURE: 64 MMHG | BODY MASS INDEX: 37.98 KG/M2 | OXYGEN SATURATION: 93 % | HEIGHT: 64 IN | SYSTOLIC BLOOD PRESSURE: 159 MMHG | TEMPERATURE: 98.1 F

## 2025-05-05 DIAGNOSIS — K59.03 OPIOID-INDUCED CONSTIPATION: ICD-10-CM

## 2025-05-05 DIAGNOSIS — T40.2X5A OPIOID-INDUCED CONSTIPATION: ICD-10-CM

## 2025-05-05 DIAGNOSIS — G89.18 POST-OP PAIN: Primary | ICD-10-CM

## 2025-05-05 LAB
ALBUMIN SERPL-MCNC: 3.8 G/DL (ref 3.5–5.2)
ALBUMIN/GLOB SERPL: 1.3 G/DL
ALP SERPL-CCNC: 133 U/L (ref 39–117)
ALT SERPL W P-5'-P-CCNC: 26 U/L (ref 1–33)
ANION GAP SERPL CALCULATED.3IONS-SCNC: 13 MMOL/L (ref 5–15)
AST SERPL-CCNC: 28 U/L (ref 1–32)
BASOPHILS # BLD AUTO: 0.07 10*3/MM3 (ref 0–0.2)
BASOPHILS NFR BLD AUTO: 0.8 % (ref 0–1.5)
BILIRUB SERPL-MCNC: 0.4 MG/DL (ref 0–1.2)
BUN SERPL-MCNC: 19 MG/DL (ref 8–23)
BUN/CREAT SERPL: 15.4 (ref 7–25)
CALCIUM SPEC-SCNC: 9.5 MG/DL (ref 8.6–10.5)
CHLORIDE SERPL-SCNC: 97 MMOL/L (ref 98–107)
CO2 SERPL-SCNC: 25 MMOL/L (ref 22–29)
CREAT SERPL-MCNC: 1.23 MG/DL (ref 0.57–1)
CRP SERPL-MCNC: 0.61 MG/DL (ref 0–0.5)
DEPRECATED RDW RBC AUTO: 44.6 FL (ref 37–54)
EGFRCR SERPLBLD CKD-EPI 2021: 46.8 ML/MIN/1.73
EOSINOPHIL # BLD AUTO: 0.21 10*3/MM3 (ref 0–0.4)
EOSINOPHIL NFR BLD AUTO: 2.4 % (ref 0.3–6.2)
ERYTHROCYTE [DISTWIDTH] IN BLOOD BY AUTOMATED COUNT: 13.6 % (ref 12.3–15.4)
ERYTHROCYTE [SEDIMENTATION RATE] IN BLOOD: 79 MM/HR (ref 0–30)
GLOBULIN UR ELPH-MCNC: 3 GM/DL
GLUCOSE SERPL-MCNC: 103 MG/DL (ref 65–99)
HCT VFR BLD AUTO: 34.6 % (ref 34–46.6)
HGB BLD-MCNC: 11.3 G/DL (ref 12–15.9)
IMM GRANULOCYTES # BLD AUTO: 0.04 10*3/MM3 (ref 0–0.05)
IMM GRANULOCYTES NFR BLD AUTO: 0.5 % (ref 0–0.5)
LYMPHOCYTES # BLD AUTO: 1.7 10*3/MM3 (ref 0.7–3.1)
LYMPHOCYTES NFR BLD AUTO: 19.5 % (ref 19.6–45.3)
MCH RBC QN AUTO: 29.4 PG (ref 26.6–33)
MCHC RBC AUTO-ENTMCNC: 32.7 G/DL (ref 31.5–35.7)
MCV RBC AUTO: 90.1 FL (ref 79–97)
MONOCYTES # BLD AUTO: 0.78 10*3/MM3 (ref 0.1–0.9)
MONOCYTES NFR BLD AUTO: 8.9 % (ref 5–12)
NEUTROPHILS NFR BLD AUTO: 5.92 10*3/MM3 (ref 1.7–7)
NEUTROPHILS NFR BLD AUTO: 67.9 % (ref 42.7–76)
NRBC BLD AUTO-RTO: 0 /100 WBC (ref 0–0.2)
PLATELET # BLD AUTO: 395 10*3/MM3 (ref 140–450)
PMV BLD AUTO: 11.2 FL (ref 6–12)
POTASSIUM SERPL-SCNC: 4.3 MMOL/L (ref 3.5–5.2)
PROT SERPL-MCNC: 6.8 G/DL (ref 6–8.5)
RBC # BLD AUTO: 3.84 10*6/MM3 (ref 3.77–5.28)
SODIUM SERPL-SCNC: 135 MMOL/L (ref 136–145)
WBC NRBC COR # BLD AUTO: 8.72 10*3/MM3 (ref 3.4–10.8)

## 2025-05-05 PROCEDURE — 96375 TX/PRO/DX INJ NEW DRUG ADDON: CPT

## 2025-05-05 PROCEDURE — 25010000002 ONDANSETRON PER 1 MG: Performed by: FAMILY MEDICINE

## 2025-05-05 PROCEDURE — 86140 C-REACTIVE PROTEIN: CPT | Performed by: FAMILY MEDICINE

## 2025-05-05 PROCEDURE — 93971 EXTREMITY STUDY: CPT | Performed by: SURGERY

## 2025-05-05 PROCEDURE — 99284 EMERGENCY DEPT VISIT MOD MDM: CPT

## 2025-05-05 PROCEDURE — 72131 CT LUMBAR SPINE W/O DYE: CPT

## 2025-05-05 PROCEDURE — 96376 TX/PRO/DX INJ SAME DRUG ADON: CPT

## 2025-05-05 PROCEDURE — 25010000002 MORPHINE PER 10 MG: Performed by: FAMILY MEDICINE

## 2025-05-05 PROCEDURE — 85025 COMPLETE CBC W/AUTO DIFF WBC: CPT | Performed by: FAMILY MEDICINE

## 2025-05-05 PROCEDURE — 80053 COMPREHEN METABOLIC PANEL: CPT | Performed by: FAMILY MEDICINE

## 2025-05-05 PROCEDURE — 93971 EXTREMITY STUDY: CPT

## 2025-05-05 PROCEDURE — 96374 THER/PROPH/DIAG INJ IV PUSH: CPT

## 2025-05-05 PROCEDURE — 85652 RBC SED RATE AUTOMATED: CPT | Performed by: FAMILY MEDICINE

## 2025-05-05 RX ORDER — MORPHINE SULFATE 2 MG/ML
2 INJECTION, SOLUTION INTRAMUSCULAR; INTRAVENOUS ONCE
Status: COMPLETED | OUTPATIENT
Start: 2025-05-05 | End: 2025-05-05

## 2025-05-05 RX ORDER — ONDANSETRON 2 MG/ML
4 INJECTION INTRAMUSCULAR; INTRAVENOUS ONCE
Status: COMPLETED | OUTPATIENT
Start: 2025-05-05 | End: 2025-05-05

## 2025-05-05 RX ORDER — SODIUM CHLORIDE 0.9 % (FLUSH) 0.9 %
10 SYRINGE (ML) INJECTION AS NEEDED
Status: DISCONTINUED | OUTPATIENT
Start: 2025-05-05 | End: 2025-05-05 | Stop reason: HOSPADM

## 2025-05-05 RX ADMIN — MORPHINE SULFATE 4 MG: 4 INJECTION, SOLUTION INTRAMUSCULAR; INTRAVENOUS at 16:17

## 2025-05-05 RX ADMIN — MORPHINE SULFATE 2 MG: 2 INJECTION, SOLUTION INTRAMUSCULAR; INTRAVENOUS at 18:05

## 2025-05-05 RX ADMIN — ONDANSETRON 4 MG: 2 INJECTION INTRAMUSCULAR; INTRAVENOUS at 16:17

## 2025-05-05 NOTE — DISCHARGE INSTRUCTIONS
Okay to take your Roxicodone every 4 hours and take the Flexeril as prescribed.  Hold the Toradol.  Please follow-up with Dr. Glez about your symptoms.  Return to ER for any acute worsening issues.

## 2025-05-05 NOTE — ED PROVIDER NOTES
I have spoken Subjective   History of Present Illness    Patient is a pleasant 74-year-old female with chief complaint of constipation and new left leg pain status post surgery.    Patient completed a direct lateral fusion with posterior pedicle screw on L2-3 and L3-4 with pedicle screws from L2-L5 on August 25, 2025 with her neurosurgeon, Dr. Zaki Glez.  She states she did well immediately postoperatively.  She did have 1 bowel movement the day of surgery.  Afterwards, she felt the urgency to defecate but was unable to do so.  She tried drinking magnesium citrate and then took an enema last week with small results.  She has not had a bowel movement since then.  She denies any bladder dysfunction.  She denies any saddle anesthesia.  She did notice in the past several days that she has had severe pain at the left lateral incision going down to the left foot.  She denies any loss of sensation.  She reports any type of movement makes it worse but she is able to get around to some degree.  She denies any fever.  She denies any drainage.  She has tried oxycodone which was prescribed to her postoperatively but has not had complete resolution.  She had transient relief.    Review of Systems   Constitutional:  Positive for activity change. Negative for fever.   HENT: Negative.     Respiratory: Negative.     Cardiovascular: Negative.    Gastrointestinal:  Positive for constipation.   Genitourinary: Negative.  Negative for decreased urine volume and difficulty urinating.   Musculoskeletal:  Positive for back pain and gait problem.   Skin: Negative.    Neurological:  Negative for weakness and numbness.   Psychiatric/Behavioral: Negative.     All other systems reviewed and are negative.      Past Medical History:   Diagnosis Date    Arthritis     Back pain     DDD (degenerative disc disease), lumbar 04/2025    GERD (gastroesophageal reflux disease)     HTN (hypertension)     Kidney disease, chronic, stage III (GFR 30-59  ml/min)     Scoliosis of lumbar spine 04/2025    Spinal stenosis, lumbar 04/2025       Allergies   Allergen Reactions    Darvon [Propoxyphene] GI Intolerance    Demerol [Meperidine] Nausea And Vomiting and GI Intolerance    Sulfamethoxazole-Trimethoprim Rash      - BACTRIM -     Tramadol Nausea And Vomiting and GI Intolerance    Tylenol [Acetaminophen] GI Intolerance    Statins Rash       Past Surgical History:   Procedure Laterality Date    BACK SURGERY      BUNIONECTOMY      CATARACT EXTRACTION Bilateral     CHOLECYSTECTOMY      HAND SURGERY Right     HYSTERECTOMY      Partial    KNEE SURGERY         History reviewed. No pertinent family history.    Social History     Socioeconomic History    Marital status:    Tobacco Use    Smoking status: Former     Types: Cigarettes    Smokeless tobacco: Never   Vaping Use    Vaping status: Never Used   Substance and Sexual Activity    Alcohol use: Never    Drug use: Never    Sexual activity: Defer     Birth control/protection: Hysterectomy       Prior to Admission medications    Medication Sig Start Date End Date Taking? Authorizing Provider   amLODIPine (NORVASC) 5 MG tablet Take 1 tablet by mouth Daily.    Ailyn Guy MD   atenolol (TENORMIN) 25 MG tablet Take 1 tablet by mouth Every Morning. 9/27/23   Ailyn Guy MD   Cholecalciferol 125 MCG (5000 UT) tablet Take 1 tablet by mouth Daily.    Ailyn Guy MD   cyclobenzaprine (FLEXERIL) 10 MG tablet Take 1 tablet by mouth 3 (Three) Times a Day As Needed for Muscle Spasms. 9/28/23   Myriam Larson PA-C   Dupilumab 300 MG/2ML solution prefilled syringe Inject 2 mL under the skin into the appropriate area as directed Every 14 (Fourteen) Days. 2 weeks a month    Ailyn Guy MD   ezetimibe (ZETIA) 10 MG tablet Take 1 tablet by mouth Daily. 6/16/23   Ailyn Guy MD   hydroCHLOROthiazide (MICROZIDE) 12.5 MG capsule Take 1 capsule by mouth Every Morning. 7/10/23   Brooks  "MD Ailyn   ketorolac (TORADOL) 10 MG tablet Take 1 tablet by mouth Every 6 (Six) Hours As Needed for Severe Pain for up to 12 doses. 4/26/25   Roland Glez, DO   lisinopril (PRINIVIL,ZESTRIL) 20 MG tablet Take 1 tablet by mouth 2 (Two) Times a Day.    ProviderAilyn MD   metroNIDAZOLE (METROCREAM) 0.75 % cream Apply 1 Application topically to the appropriate area as directed 2 (Two) Times a Day. Apply to face    Ailyn Guy MD   naloxone (NARCAN) 4 MG/0.1ML nasal spray Call 911. Don't prime. [place] in 1 nostril for overdose. Repeat in 2-3 minutes in other nostril if no or minimal breathing/responsiveness. 4/29/25   Roland Glez, DO   omeprazole (priLOSEC) 40 MG capsule Take 1 capsule by mouth Every Morning. 6/15/23   Ailyn Guy MD   oxyCODONE (Roxicodone) 5 MG immediate release tablet Take 1 tablet by mouth Every 6 (Six) Hours As Needed for Moderate Pain for up to 5 days. 4/29/25 5/4/25  Roland Glez, DO   triamcinolone (KENALOG) 0.5 % ointment Apply 1 Application topically to the appropriate area as directed 3 (Three) Times a Day As Needed for Irritation or Rash.    ProviderAilyn MD       Medications   sodium chloride 0.9 % flush 10 mL (has no administration in time range)   Morphine sulfate (PF) injection 2 mg (has no administration in time range)   morphine injection 4 mg (4 mg Intravenous Given 5/5/25 1617)   ondansetron (ZOFRAN) injection 4 mg (4 mg Intravenous Given 5/5/25 1617)       /51   Pulse 63   Temp 97.9 °F (36.6 °C) (Oral)   Resp 20   Ht 162.6 cm (64\")   Wt 101 kg (222 lb 8 oz)   SpO2 91%   BMI 38.19 kg/m²       Objective   Physical Exam  Vitals reviewed.   Constitutional:       Appearance: Normal appearance. She is obese.   HENT:      Head: Normocephalic and atraumatic.      Nose: Nose normal.   Eyes:      Extraocular Movements: Extraocular movements intact.   Cardiovascular:      Rate and Rhythm: Normal rate and regular rhythm. "   Pulmonary:      Effort: Pulmonary effort is normal.      Breath sounds: Normal breath sounds.   Abdominal:      Tenderness: There is no abdominal tenderness.      Comments: Left lateral incision healing nicely without any secondary signs of infection.   Musculoskeletal:      Cervical back: Normal, normal range of motion and neck supple.      Thoracic back: Normal.      Lumbar back: No tenderness.      Right hip: Normal.      Left hip: Tenderness present.      Right knee: Normal.      Left knee: Normal.      Right lower leg: Normal.      Right ankle: Normal.      Right Achilles Tendon: Normal.      Left ankle: Normal.      Left Achilles Tendon: Normal.      Right foot: Normal.      Left foot: Normal.        Legs:       Comments: Lumbar incisions are healing nicely.  Dressing has been removed.  No secondary signs of infection.  Full range of motion to bilateral lower extremities with palpable bilaterally.  Negative Homans' sign bilaterally.  Mild tenderness palpate on the anterior left thigh with small area of bruising measured about centimeter in size.   Neurological:      Mental Status: She is alert.      Sensory: Sensation is intact.      Motor: Motor function is intact. No weakness.         Procedures         Lab Results (last 24 hours)       Procedure Component Value Units Date/Time    CBC & Differential [[id number]]  (Abnormal) Collected: 05/05/25 1614    Specimen: Blood Updated: 05/05/25 1633    Narrative:      The following orders were created for panel order CBC & Differential.  Procedure                               Abnormality         Status                     ---------                               -----------         ------                     CBC Auto Differential[[id number]]        Abnormal            Final result                 Please view results for these tests on the individual orders.    Comprehensive Metabolic Panel [[id number]]  (Abnormal) Collected: 05/05/25 1614    Specimen: Blood Updated:  05/05/25 1655     Glucose 103 mg/dL      BUN 19 mg/dL      Creatinine 1.23 mg/dL      Sodium 135 mmol/L      Potassium 4.3 mmol/L      Comment: Slight hemolysis detected by analyzer. Result may be falsely elevated.        Chloride 97 mmol/L      CO2 25.0 mmol/L      Calcium 9.5 mg/dL      Total Protein 6.8 g/dL      Albumin 3.8 g/dL      ALT (SGPT) 26 U/L      AST (SGOT) 28 U/L      Alkaline Phosphatase 133 U/L      Total Bilirubin 0.4 mg/dL      Globulin 3.0 gm/dL      A/G Ratio 1.3 g/dL      BUN/Creatinine Ratio 15.4     Anion Gap 13.0 mmol/L      eGFR 46.8 mL/min/1.73     Narrative:      GFR Categories in Chronic Kidney Disease (CKD)              GFR Category          GFR (mL/min/1.73)    Interpretation  G1                    90 or greater        Normal or high (1)  G2                    60-89                Mild decrease (1)  G3a                   45-59                Mild to moderate decrease  G3b                   30-44                Moderate to severe decrease  G4                    15-29                Severe decrease  G5                    14 or less           Kidney failure    (1)In the absence of evidence of kidney disease, neither GFR category G1 or G2 fulfill the criteria for CKD.    eGFR calculation 2021 CKD-EPI creatinine equation, which does not include race as a factor    C-reactive Protein [[id number]]  (Abnormal) Collected: 05/05/25 1614    Specimen: Blood Updated: 05/05/25 1652     C-Reactive Protein 0.61 mg/dL     Sedimentation Rate [[id number]]  (Abnormal) Collected: 05/05/25 1614    Specimen: Blood Updated: 05/05/25 1649     Sed Rate 79 mm/hr     CBC Auto Differential [[id number]]  (Abnormal) Collected: 05/05/25 1614    Specimen: Blood Updated: 05/05/25 1633     WBC 8.72 10*3/mm3      RBC 3.84 10*6/mm3      Hemoglobin 11.3 g/dL      Hematocrit 34.6 %      MCV 90.1 fL      MCH 29.4 pg      MCHC 32.7 g/dL      RDW 13.6 %      RDW-SD 44.6 fl      MPV 11.2 fL      Platelets 395 10*3/mm3       Neutrophil % 67.9 %      Lymphocyte % 19.5 %      Monocyte % 8.9 %      Eosinophil % 2.4 %      Basophil % 0.8 %      Immature Grans % 0.5 %      Neutrophils, Absolute 5.92 10*3/mm3      Lymphocytes, Absolute 1.70 10*3/mm3      Monocytes, Absolute 0.78 10*3/mm3      Eosinophils, Absolute 0.21 10*3/mm3      Basophils, Absolute 0.07 10*3/mm3      Immature Grans, Absolute 0.04 10*3/mm3      nRBC 0.0 /100 WBC             CT Lumbar Spine Without Contrast  Result Date: 5/5/2025  Narrative: EXAMINATION:  CT LUMBAR SPINE WO CONTRAST-  5/5/2025 4:20 PM  HISTORY: Back pain. Postop. Constipated. Pain on the left side extending down the left leg.  TECHNIQUE: Spiral CT was performed of the lumbar spine. Sagittal and coronal images were reconstructed.  DLP: 882.91 mGy.cm Automated dosage reduction technique was utilized to reduce patient dosage.  COMPARISON: No comparison study.  FINDINGS: There is atheromatous calcification of the aortoiliac vessels. On the coronal images, there is lumbar scoliosis convex to the right. There are fluid collections in the posterior subcutaneous tissues bilaterally. The fluid collection on the right is larger measuring about 8 cm in length and about 3.7 cm and greatest short axis diameter. There is a small amount of air in the soft tissues posteriorly.  DISC SPACES:  T12-L1: There is severe disc narrowing with vacuum disc phenomenon. There is mild disc bulging and endplate spurring. There is mild dural sac compression. There is mild facet arthropathy. There is no significant central spinal canal narrowing. There is moderate to severe foraminal narrowing on the right.  L1-2: The disc is fairly well maintained in height. There is vacuum disc phenomenon. There is moderate disc bulging. There is dural sac compression. The facet joints are maintained. There is minimal narrowing of the central canal-dural sac. There is mild to moderate inferior recess foraminal narrowing bilaterally.  L2-3: There has  been recent interbody fusion and posterior instrumented fusion. There is moderate narrowing of the disc space. There is disc bulging and endplate spurring producing dural sac compression. There is mild facet arthropathy on the right. There is mild narrowing of the central canal-dural sac. There is severe left and mild right-sided foraminal narrowing.  L3-4: There has been recent interbody fusion and posterior instrumented fusion. There is mild to moderate narrowing of the disc space. There are some pre-existing cystic changes along the endplates related to severe degenerative disc disease. There is moderate hypertrophic change of the facet joints. There is thickening of ligamentum flavum. There is mild narrowing of the central canal-dural sac. There is severe left and mild right-sided foraminal narrowing.  L4-5: There has been previous interbody fusion and posterior instrumented fusion. There is moderate to severe narrowing of the disc space, stable compared to the MRI on 9/11/2024. There is moderate facet arthropathy. There is no significant central spinal canal narrowing. There is moderate to severe left and mild to moderate right-sided foraminal narrowing.  L5-S1: The disc is maintained in height. There is moderate to severe facet arthropathy. There is no significant central spinal canal narrowing. No significant foraminal narrowing is seen.       Impression: 1. Postoperative and degenerative changes of the lumbar spine, as described. 2. There are postoperative fluid collections in the subcutaneous fat bilaterally. The fluid collection on the right is larger. These are likely hematomas or seromas related to the recent surgery. There is a small amount of air in the soft tissues. Abscess is felt to be less likely. 3. Atheromatous calcification of the aortoiliac vessels. 4. Lumbar scoliosis convex to the right.   The full report of this exam was immediately signed and available to the emergency room. The patient is  currently in the emergency room.  This report was signed and finalized on 5/5/2025 5:40 PM by Dr. Thanh Phillips MD.      US Venous Doppler Lower Extremity Left (duplex)  Result Date: 5/5/2025  Narrative: History: Pain and swelling      Impression: Impression: There is no evidence of deep venous thrombosis or superficial thrombophlebitis of the left lower extremity.  Comments: Left lower extremity venous duplex exam was performed using color Doppler flow, Doppler wave form analysis, and grayscale imaging, with and without compression. There is no evidence of deep venous thrombosis of the common femoral, superficial femoral, popliteal, posterior tibial, and peroneal veins. There is no thrombus identified in the saphenofemoral junction or the greater saphenous vein.   This report was signed and finalized on 5/5/2025 5:02 PM by Dr. Ortega Ogden MD.      XR Spine Lumbar AP & Lateral  Result Date: 4/25/2025  Narrative: EXAMINATION: XR SPINE LUMBAR AP AND LATERAL- 4/25/2025 11:32 AM  HISTORY: posterior fusion; M41.56-Other secondary scoliosis, lumbar region; M48.062-Spinal stenosis, lumbar region with neurogenic claudication.  REPORT:  Fluoroscopy time: 2.0-minute  Radiation dose: 37.132 Gycm2 (Air Kerma).  Number of fluoro spot images obtained: 2.  COMPARISON: MRI lumbar spine 9/11/2024.  Intraoperative fluoroscopic spot films of the lumbar spine were obtained under the supervision of the neurosurgery service, no radiologist was present for image acquisition. The images demonstrate posterior fusion hardware that appears to extend from L2-L5 as well as lateral interbody fusion hardware at L2-3 and L3-4. Please see the operative or procedural notes for details.   This report was signed and finalized on 4/25/2025 11:34 AM by Dr. Kevyn Daigle MD.      FL C Arm During Surgery  Result Date: 4/25/2025  Narrative: This procedure was auto-finalized with no dictation required.    XR Spine Lumbar AP & Lateral  Result Date:  4/25/2025  Narrative: XR SPINE LUMBAR AP AND LATERAL- 4/25/2025 6:45 AM  HISTORY: dlif; M41.56-Other secondary scoliosis, lumbar region; M48.062-Spinal stenosis, lumbar region with neurogenic claudication  COMPARISON: None  FLUOROSCOPY TIME: 1 minute 37 seconds  FLUOROSCOPY DOSE: 104.3 mGy  NUMBER OF IMAGES: 4      Impression:  Intraoperative fluoroscopic images during direct lateral interbody fusion.  Please refer to the operative note for more details.  This report was signed and finalized on 4/25/2025 10:40 AM by Dr. Papi Mayo MD.      FL C Arm During Surgery  Result Date: 4/25/2025  Narrative: This procedure was auto-finalized with no dictation required.      ED Course  ED Course as of 05/05/25 1759   Mon May 05, 2025   1606 I did speak with Dr. Glez, the patient's neurosurgeon.  He requested patient have laboratory data completed.  I will proceed with a ultrasound of her left lower extremity.  CT study of the lumbar spine will be ordered as well. [TK]   170 ontains abnormal data COMPREHENSIVE METABOLIC PANEL  Order: [id number]  Component  Ref Range & Units 10 mo ago  Glucose  70 - 105 mg/dL 99  BUN  7 - 19 mg/dL 30 High   Creatinine  0.6 - 1.1 mg/dL 1.2 High   BUN/Creatinine Ratio  12 - 20 BN/CR 25 High   Calcium  8.4 - 10.2 mg/dL 9.8  Protein Total  6.4 - 8.3 g/dL 6.5  albumin  3.5 - 5.2 g/dL 3.9  Globulin Total  2.5 - 3.7 g/dL 2.6  Albumin/Globulin Ratio  1.1 - 2.2 g/dL 1.5  Alkaline Phosphatse  40 - 150 U/L 74  ALT  0 - 55 U/L 22  AST  5 - 34 U/L 19  Bilirubin Total  0.20 - 1.20 md/dL 0.30  CO2  22 - 29 mmol/L 26  Sodium  136 - 145 mmol/L 142  Chloride  98 - 107 mmol/L 106  Potassium  3.5 - 5.1 mmol/L 4.3  GFR  mL/min/1.73m2 44  Resulting Agency      [TK]   8732 With Dr. Glez about the patient's studies and laboratory data.  He request the patient hold the Toradol given she has chronic kidney disease but to increase her oxycodone to every 4 hours and go ahead and take complexing which she has been  hesitant to take.  The patient is to call his office tomorrow to keep posted how she is doing.  Strict return precaution advised.  Patient understands that she has opiate induced constipation and she is continuing on pain medicine in the meantime since she is recently postoperative.  Patient voiced understanding.  Strict return precaution advised.  She will be discharged in stable condition. [TK]      ED Course User Index  [TK] Cleveland Mullins PA          MDM     Amount and/or Complexity of Data Reviewed  Decide to obtain previous medical records or to obtain history from someone other than the patient: yes        Final diagnoses:   Post-op pain   Opioid-induced constipation          Cleveland Mullins PA  05/05/25 5015

## 2025-05-05 NOTE — TELEPHONE ENCOUNTER
Patient called she had Lumbar lateral Fusion on 4/25 she's taking Oxycodone for the pain and it's not touching it hurting In her sides, back and legs can't walk miserable can't have a BM even after [facility] told her to go to ER per Dr. Glez for pain control

## 2025-05-06 ENCOUNTER — TELEPHONE (OUTPATIENT)
Dept: NEUROSURGERY | Facility: CLINIC | Age: 75
End: 2025-05-06
Payer: MEDICARE

## 2025-05-06 DIAGNOSIS — M41.9 SCOLIOSIS, UNSPECIFIED SCOLIOSIS TYPE, UNSPECIFIED SPINAL REGION: Primary | ICD-10-CM

## 2025-05-06 NOTE — TELEPHONE ENCOUNTER
Caller: JACQUELYN CASTELLANO    Relationship: SELF    Best call back number: [phone]    Requested Prescriptions:   oxycodone - ED states an increase to every 4 hours ED (05/05/2025) and f/u with DR TAM TO UPDATE ON PT CONDITION     Pharmacy where request should be sent:   [id number] - [place], [place] - [address] [place] [address] [place] - [phone]      Last office visit with prescribing clinician: 12/16/2024   Last telemedicine visit with prescribing clinician: Visit date not found   Next office visit with prescribing clinician: 6/18/2025     Additional details provided by patient: PT STATES HER MEDICATION CANNOT CONTAIN TYLENOL DUE TO ALLERGY- PT IS OUT OF MEDICATION - PT STATES SHE CAN TOLERATE THE PAIN WITH MEDICATION - NO COMPLICATIONS OR SYMPTOMS TO REPORT AT THIS TIME     Does the patient have less than a 3 day supply:  [x] Yes  [] No    Would you like a call back once the refill request has been completed: [] Yes [x] No    If the office needs to give you a call back, can they leave a voicemail: [x] Yes [] No    Marcy Ernst Rep   05/06/25 08:35 CDT

## 2025-05-06 NOTE — TELEPHONE ENCOUNTER
PATIENT CALLED TO CHECK THE STATUS OF THIS MEDICATION.  STATES SHE HAS 30 MINS AND SHE IS DUE FOR ANOTHER DOSE AND SHE HAS NONE LEFT    PLEASE CALL PATIENT  THANK YOU

## 2025-05-07 DIAGNOSIS — M41.9 SCOLIOSIS, UNSPECIFIED SCOLIOSIS TYPE, UNSPECIFIED SPINAL REGION: Primary | ICD-10-CM

## 2025-05-07 RX ORDER — OXYCODONE HYDROCHLORIDE 5 MG/1
5 TABLET ORAL EVERY 4 HOURS PRN
Qty: 30 TABLET | Refills: 0 | Status: SHIPPED | OUTPATIENT
Start: 2025-05-07 | End: 2025-05-12

## 2025-05-12 ENCOUNTER — OFFICE VISIT (OUTPATIENT)
Dept: NEUROSURGERY | Facility: CLINIC | Age: 75
End: 2025-05-12
Payer: MEDICARE

## 2025-05-12 VITALS — HEIGHT: 64 IN | BODY MASS INDEX: 37.42 KG/M2 | WEIGHT: 219.2 LBS

## 2025-05-12 DIAGNOSIS — M54.16 LUMBAR RADICULOPATHY: ICD-10-CM

## 2025-05-12 DIAGNOSIS — M41.56 SCOLIOSIS OF LUMBAR REGION DUE TO DEGENERATIVE DISEASE OF SPINE IN ADULT: Primary | ICD-10-CM

## 2025-05-12 DIAGNOSIS — Z78.9 NON-SMOKER: ICD-10-CM

## 2025-05-12 DIAGNOSIS — E66.09 CLASS 2 OBESITY DUE TO EXCESS CALORIES WITH BODY MASS INDEX (BMI) OF 37.0 TO 37.9 IN ADULT, UNSPECIFIED WHETHER SERIOUS COMORBIDITY PRESENT: ICD-10-CM

## 2025-05-12 DIAGNOSIS — E66.812 CLASS 2 OBESITY DUE TO EXCESS CALORIES WITH BODY MASS INDEX (BMI) OF 37.0 TO 37.9 IN ADULT, UNSPECIFIED WHETHER SERIOUS COMORBIDITY PRESENT: ICD-10-CM

## 2025-05-12 PROCEDURE — 1160F RVW MEDS BY RX/DR IN RCRD: CPT | Performed by: PHYSICIAN ASSISTANT

## 2025-05-12 PROCEDURE — 99024 POSTOP FOLLOW-UP VISIT: CPT | Performed by: PHYSICIAN ASSISTANT

## 2025-05-12 PROCEDURE — 1159F MED LIST DOCD IN RCRD: CPT | Performed by: PHYSICIAN ASSISTANT

## 2025-05-12 RX ORDER — LISINOPRIL 10 MG/1
2 TABLET ORAL EVERY 12 HOURS SCHEDULED
COMMUNITY
Start: 2025-01-27

## 2025-05-12 RX ORDER — GABAPENTIN 100 MG/1
CAPSULE ORAL
COMMUNITY

## 2025-05-12 RX ORDER — POLYETHYLENE GLYCOL 3350 17 G/17G
17 POWDER, FOR SOLUTION ORAL DAILY
COMMUNITY
Start: 2025-05-08

## 2025-05-12 RX ORDER — COLCHICINE 0.6 MG/1
TABLET ORAL
COMMUNITY
Start: 2025-04-01

## 2025-05-12 RX ORDER — NYSTATIN 100000 [USP'U]/G
POWDER TOPICAL
COMMUNITY

## 2025-05-12 RX ORDER — PIMECROLIMUS 10 MG/G
CREAM TOPICAL
COMMUNITY

## 2025-05-12 RX ORDER — GABAPENTIN 100 MG/1
CAPSULE ORAL
Qty: 90 CAPSULE | Refills: 0 | Status: SHIPPED | OUTPATIENT
Start: 2025-05-12

## 2025-05-12 RX ORDER — OXYCODONE HYDROCHLORIDE 5 MG/1
5 TABLET ORAL EVERY 4 HOURS PRN
Qty: 36 TABLET | Refills: 0 | Status: SHIPPED | OUTPATIENT
Start: 2025-05-12 | End: 2025-05-18

## 2025-05-12 NOTE — PATIENT INSTRUCTIONS
No lifting > 10 pounds for total 6 months  Brace should be worn when standing and walking for 3 months  Avoid bending and and twisting  No tub baths or swimming until incisionsare completely healed.  Walking is encouraged.  You can drive if you have not had pain medication within previous 4 hours.  Call the office for any worsening of pain, calf pain and/or swelling or new weakness or other concerns.   Continue walker to prevent falls

## 2025-05-12 NOTE — PROGRESS NOTES
"    Chief complaint:   Chief Complaint   Patient presents with    Post-op     DIRECT LATERAL FUSION WITH POSTERIOR PEDICLE SCREW instrumentation L2/3 and L3/4 with pedicle screws from L2-L5- pt states she has pain mainly in her legs. Stated she is having trouble sleeping at night.         Subjective     HPI: Kemi is almost 3 weeks status post L2-3 and L3-4 DLIF with revision pedicle screws L2-5.  She has had some issues with postop constipation and had to go to the hospital locally and get an enema.  She is on MiraLAX and senna S.  She also has had quite a bit of pain postoperatively especially in the left lateral incision region with pain extending down the left anterior thigh. No post op fevers or wound drainage. She is ambulating with a walker. She denies weakness and paresthesias.    Review of Systems      Objective      Vital Signs  Ht 162.6 cm (64.02\")   Wt 99.4 kg (219 lb 3.2 oz)   BMI 37.61 kg/m²     Physical Exam  Constitutional:       Appearance: Normal appearance. She is well-developed.   HENT:      Head: Normocephalic.   Eyes:      Pupils: Pupils are equal, round, and reactive to light.   Cardiovascular:      Rate and Rhythm: Normal rate.   Pulmonary:      Effort: Pulmonary effort is normal.   Musculoskeletal:         General: Normal range of motion.      Cervical back: Normal range of motion.   Skin:     General: Skin is warm and dry.      Comments: Incisions are healing nicely.  Remaining Steri-Strips were removed.  No drainage or signs of infection.   Neurological:      Mental Status: She is oriented to person, place, and time.      GCS: GCS eye subscore is 4. GCS verbal subscore is 5. GCS motor subscore is 6.      Cranial Nerves: No cranial nerve deficit.      Sensory: No sensory deficit.      Motor: Motor strength is normal.No weakness.      Deep Tendon Reflexes: Reflexes are normal and symmetric.   Psychiatric:         Speech: Speech normal.         Behavior: Behavior normal.         Thought " Content: Thought content normal.         Neurological Exam  Mental Status  Awake, alert and oriented to person, place and time. Oriented to person, place, and time. Speech is normal.    Cranial Nerves  CN III, IV, VI: Pupils equal round and reactive to light bilaterally.    Motor  Decreased muscle bulk throughout. Strength is 5/5 throughout all four extremities.    Sensory  Sensation is intact to light touch, pinprick, vibration and proprioception in all four extremities.    Reflexes  Deep tendon reflexes are 2+ and symmetric in all four extremities.    Gait    Gait is stable with walker assist.       Imaging review: CT Lumbar Spine Without Contrast  Result Date: 5/5/2025  1. Postoperative and degenerative changes of the lumbar spine, as described. 2. There are postoperative fluid collections in the subcutaneous fat bilaterally. The fluid collection on the right is larger. These are likely hematomas or seromas related to the recent surgery. There is a small amount of air in the soft tissues. Abscess is felt to be less likely. 3. Atheromatous calcification of the aortoiliac vessels. 4. Lumbar scoliosis convex to the right.   The full report of this exam was immediately signed and available to the emergency room. The patient is currently in the emergency room.  This report was signed and finalized on 5/5/2025 5:40 PM by Dr. Thanh Phillips MD.      US Venous Doppler Lower Extremity Left (duplex)  Result Date: 5/5/2025  Impression: There is no evidence of deep venous thrombosis or superficial thrombophlebitis of the left lower extremity.  Comments: Left lower extremity venous duplex exam was performed using color Doppler flow, Doppler wave form analysis, and grayscale imaging, with and without compression. There is no evidence of deep venous thrombosis of the common femoral, superficial femoral, popliteal, posterior tibial, and peroneal veins. There is no thrombus identified in the saphenofemoral junction or the greater  saphenous vein.   This report was signed and finalized on 5/5/2025 5:02 PM by Dr. Ortega Ogden MD.         Assessment/Plan: Kemi is neurologically stable at almost 3 weeks status post two-level DLIF with revision pedicle screws L2-L5 5.  She is having expected postoperative pain.  Incisions are healing nicely.    She will continue 10 pound lifting restriction and avoid bending and twisting.  She will continue her brace when ambulatory.    I will asked Dr. Glez to send refill pain medication.    I sent prescription gabapentin per patient request to utilize at bedtime.    Follow-up with Dr. Glez in 4 weeks with 4 views lumbar spine prior to that visit.  If she has any issues or concerns before next appointment, she will call the office.    Patient is a nonsmoker    The patient's Body mass index is 37.61 kg/m².. BMI is above normal parameters. Recommendations include: educational material    ADVANCED CARE PLANNING  Information on advance directives provided in AVS.    STEADI Fall Risk Assessment has not been completed.     Diagnoses and all orders for this visit:    1. Scoliosis of lumbar region due to degenerative disease of spine in adult (Primary)  -     XR Spine Lumbar AP & Lateral With Flex & Ext; Future    2. Non-smoker    3. Class 2 obesity due to excess calories with body mass index (BMI) of 37.0 to 37.9 in adult, unspecified whether serious comorbidity present    4. Lumbar radiculopathy  -     gabapentin (NEURONTIN) 100 MG capsule; Take 1-3 tablets at bedtime  Dispense: 90 capsule; Refill: 0        I discussed the patients findings and my recommendations with patient  Myriam Larson PA-C  05/12/25  10:17 CDT

## 2025-06-08 DIAGNOSIS — M54.16 LUMBAR RADICULOPATHY: ICD-10-CM

## 2025-06-09 RX ORDER — GABAPENTIN 100 MG/1
CAPSULE ORAL
Qty: 90 CAPSULE | Refills: 1 | Status: SHIPPED | OUTPATIENT
Start: 2025-06-09

## 2025-06-18 ENCOUNTER — OFFICE VISIT (OUTPATIENT)
Dept: NEUROSURGERY | Facility: CLINIC | Age: 75
End: 2025-06-18
Payer: MEDICARE

## 2025-06-18 ENCOUNTER — HOSPITAL ENCOUNTER (OUTPATIENT)
Dept: GENERAL RADIOLOGY | Facility: HOSPITAL | Age: 75
Discharge: HOME | End: 2025-06-18
Payer: MEDICARE

## 2025-06-18 VITALS — BODY MASS INDEX: 37.39 KG/M2 | HEIGHT: 64 IN | WEIGHT: 219 LBS

## 2025-06-18 DIAGNOSIS — M25.552 LEFT HIP PAIN: ICD-10-CM

## 2025-06-18 DIAGNOSIS — M41.56 SCOLIOSIS OF LUMBAR REGION DUE TO DEGENERATIVE DISEASE OF SPINE IN ADULT: ICD-10-CM

## 2025-06-18 DIAGNOSIS — M54.16 LUMBAR RADICULOPATHY: ICD-10-CM

## 2025-06-18 DIAGNOSIS — M54.16 LUMBAR RADICULOPATHY: Primary | ICD-10-CM

## 2025-06-18 DIAGNOSIS — E66.812 CLASS 2 OBESITY DUE TO EXCESS CALORIES WITH BODY MASS INDEX (BMI) OF 37.0 TO 37.9 IN ADULT, UNSPECIFIED WHETHER SERIOUS COMORBIDITY PRESENT: ICD-10-CM

## 2025-06-18 DIAGNOSIS — E66.09 CLASS 2 OBESITY DUE TO EXCESS CALORIES WITH BODY MASS INDEX (BMI) OF 37.0 TO 37.9 IN ADULT, UNSPECIFIED WHETHER SERIOUS COMORBIDITY PRESENT: ICD-10-CM

## 2025-06-18 PROCEDURE — 72120 X-RAY BEND ONLY L-S SPINE: CPT

## 2025-06-18 PROCEDURE — 73502 X-RAY EXAM HIP UNI 2-3 VIEWS: CPT

## 2025-06-18 PROCEDURE — 99024 POSTOP FOLLOW-UP VISIT: CPT | Performed by: NEUROLOGICAL SURGERY

## 2025-06-18 RX ORDER — OXYCODONE HYDROCHLORIDE 5 MG/1
5 TABLET ORAL EVERY 6 HOURS PRN
Qty: 20 TABLET | Refills: 0 | Status: SHIPPED | OUTPATIENT
Start: 2025-06-18 | End: 2025-06-23

## 2025-06-18 NOTE — PROGRESS NOTES
"Follow-up 8 weeks with new x-rays of back and left hip, called in for oxycodone for her    Chief complaint:   Chief Complaint   Patient presents with    Post-op     8 week post op         Subjective     HPI: I had a chance to see Kemi today in follow-up.  She is approximately 8 weeks status post her direct lateral interbody fusion and she is doing quite well.  She still has some leg weakness on the operative side which is expected but it is slowly improving.    Review of Systems      Objective      Vital Signs  Ht 162.6 cm (64.02\")   Wt 99.3 kg (219 lb)   BMI 37.57 kg/m²     Physical Exam  Eyes:      General: Lids are normal.      Extraocular Movements: Extraocular movements intact.   Neurological:      Motor: Motor strength is normal.  Psychiatric:         Speech: Speech normal.         Neurological Exam  Mental Status  Awake, alert and oriented to person, place and time. Oriented to person, place and time. Speech is normal. Language is fluent with no aphasia. Attention and concentration are normal. Fund of knowledge is appropriate for level of education.    Cranial Nerves  CN III, IV, VI: Extraocular movements intact bilaterally. Normal lids and orbits bilaterally.    Motor   No abnormal involuntary movements. Strength is 5/5 throughout all four extremities.  Left hip flexion 4/5.    Gait  Casual gait is normal including stance, stride, and arm swing.       Imaging review:   EXAMINATION: XR SPINE LUMBAR FLEX AND EXT-     HISTORY: Status post Lumbar Surgery; M54.16-Radiculopathy, lumbar  region; M41.56-Other secondary scoliosis, lumbar region     Images are stored in PACS per institutional protocol.     Lumbar spine series with flexion and extension.  3 views.     L2-L3, L3-L4, and L4-L5 discectomy changes.  L2-L5 pedicle screw hardware fusion.     The neutral lateral view shows normal curvature and alignment.  Hardware is intact.     Flexion and extension views show limited range of motion.     No changes in " hardware position or vertebral body alignment.     IMPRESSION:  1. Normal postoperative appearance.  2. No dynamic instability is seen.              Assessment/Plan:   Status post direct lateral interbody fusion with revision of pedicle screws.  Patient is doing very well at this time and will increase her activity level as tolerated.  I would like to see her back in 8 weeks to check on her progress.  I look forward to seeing her at her next visit    Patient is a nonsmoker  The patient's Body mass index is 37.57 kg/m².. BMI is above normal parameters. Recommendations include: continue with current weight loss program    Diagnoses and all orders for this visit:    1. Lumbar radiculopathy (Primary)  -     oxyCODONE (Roxicodone) 5 MG immediate release tablet; Take 1 tablet by mouth Every 6 (Six) Hours As Needed for Moderate Pain for up to 5 days.  Dispense: 20 tablet; Refill: 0  -     XR Spine Lumbar Flex & Ext; Future    2. Scoliosis of lumbar region due to degenerative disease of spine in adult  -     XR Spine Lumbar Flex & Ext; Future    3. Class 2 obesity due to excess calories with body mass index (BMI) of 37.0 to 37.9 in adult, unspecified whether serious comorbidity present    4. Left hip pain  -     XR hip w or wo pelvis 2-3 view left; Future        I discussed the patients findings and my recommendations with patient  Roland Glez DO  06/18/25  15:19 CDT

## 2025-08-04 DIAGNOSIS — M54.16 LUMBAR RADICULOPATHY: ICD-10-CM

## 2025-08-04 RX ORDER — GABAPENTIN 100 MG/1
CAPSULE ORAL
Qty: 90 CAPSULE | Refills: 1 | Status: SHIPPED | OUTPATIENT
Start: 2025-08-04

## 2025-08-13 ENCOUNTER — OFFICE VISIT (OUTPATIENT)
Dept: NEUROSURGERY | Facility: CLINIC | Age: 75
End: 2025-08-13
Payer: MEDICARE

## 2025-08-13 ENCOUNTER — HOSPITAL ENCOUNTER (OUTPATIENT)
Dept: GENERAL RADIOLOGY | Facility: HOSPITAL | Age: 75
Discharge: HOME | End: 2025-08-13
Admitting: PHYSICIAN ASSISTANT
Payer: MEDICARE

## 2025-08-13 VITALS — WEIGHT: 222.8 LBS | BODY MASS INDEX: 38.04 KG/M2 | HEIGHT: 64 IN

## 2025-08-13 DIAGNOSIS — E66.01 CLASS 2 SEVERE OBESITY DUE TO EXCESS CALORIES WITH SERIOUS COMORBIDITY AND BODY MASS INDEX (BMI) OF 38.0 TO 38.9 IN ADULT: ICD-10-CM

## 2025-08-13 DIAGNOSIS — M41.56 SCOLIOSIS OF LUMBAR REGION DUE TO DEGENERATIVE DISEASE OF SPINE IN ADULT: Primary | ICD-10-CM

## 2025-08-13 DIAGNOSIS — E66.812 CLASS 2 SEVERE OBESITY DUE TO EXCESS CALORIES WITH SERIOUS COMORBIDITY AND BODY MASS INDEX (BMI) OF 38.0 TO 38.9 IN ADULT: ICD-10-CM

## 2025-08-13 DIAGNOSIS — Z78.9 NON-SMOKER: ICD-10-CM

## 2025-08-13 DIAGNOSIS — M41.56 SCOLIOSIS OF LUMBAR REGION DUE TO DEGENERATIVE DISEASE OF SPINE IN ADULT: ICD-10-CM

## 2025-08-13 PROCEDURE — 72110 X-RAY EXAM L-2 SPINE 4/>VWS: CPT

## 2025-08-13 PROCEDURE — 99214 OFFICE O/P EST MOD 30 MIN: CPT | Performed by: PHYSICIAN ASSISTANT

## 2025-08-13 RX ORDER — OXYCODONE HYDROCHLORIDE 5 MG/1
5 TABLET ORAL EVERY 4 HOURS PRN
Qty: 36 TABLET | Refills: 0 | Status: SHIPPED | OUTPATIENT
Start: 2025-08-13 | End: 2025-08-19

## 2025-08-13 RX ORDER — CLOBETASOL PROPIONATE 0.5 MG/G
CREAM TOPICAL
COMMUNITY
Start: 2025-07-31

## 2025-08-13 RX ORDER — SUCRALFATE 1 G/1
1 TABLET ORAL
COMMUNITY
Start: 2025-08-04